# Patient Record
Sex: FEMALE | Race: WHITE | Employment: UNEMPLOYED | URBAN - NONMETROPOLITAN AREA
[De-identification: names, ages, dates, MRNs, and addresses within clinical notes are randomized per-mention and may not be internally consistent; named-entity substitution may affect disease eponyms.]

---

## 2017-06-29 PROBLEM — K02.9 DENTAL CARIES: Chronic | Status: ACTIVE | Noted: 2017-06-29

## 2019-05-15 ENCOUNTER — OUTSIDE SERVICES (OUTPATIENT)
Dept: PRIMARY CARE CLINIC | Age: 59
End: 2019-05-15
Payer: COMMERCIAL

## 2019-05-15 DIAGNOSIS — R19.7 DIARRHEA, UNSPECIFIED TYPE: Primary | ICD-10-CM

## 2019-05-15 PROCEDURE — 99309 SBSQ NF CARE MODERATE MDM 30: CPT | Performed by: FAMILY MEDICINE

## 2019-05-15 NOTE — PROGRESS NOTES
5/15/2019     Denise Rogel    : 1960 Sex: female   Age: 59 y.o. Chief Complaint   Patient presents with    Diarrhea       HPI: This 59y.o. -year-old female  presents today with a chief complaint of diarrhea. . Current medication list reviewed. The patient is tolerating all medications well without adverse events or known side effects. The patient is up-to-date on all age-appropriate wellness issues. The patient presents today with a chief complaint of diarrhea. Patient states she had 3 episodes of diarrhea yesterday. Patient denies any fever or chills. Patient states she did feel somewhat nauseated yesterday but symptoms have resolved. ROS:   Const: Denies changes in appetite, chills, fever, night sweats and weight loss. Eyes:  Denies discharge, a recent change in visual acuity, blurred vision and double vision. ENMT: Denies discharge of the ears, hearing loss, pain of the ears. Denies nasal or sinus symptoms other than stated above. Denies mouth or throat symptoms. CV:  Denies chest pain, dyspnea on exertion, orthopnea, palpitations and PND  Resp: Denies chest pain, cough, SOB and wheezing. GI: Denies abdominal pain, constipation, diarrhea, heartburn, indigestion, nausea and vomiting. : Denies dysuria, frequency, hematuria, nocturia and urgency. Musculo: Denies arthralgias and myalgia  Skin:  Denies lesions, pruritus and rash. Neuro: Denies dizziness, lightheadedness, numbness, tingling and weakness. Psych:  Denies anxiety and depression  Endocrine: Denies anxiety and depression. Hema/Lymph: Denies hematologic symptoms  Allergy/Immuno:  Denies allergic/immunologic symptoms.   Pertinent positives reviewed and noted      Current Outpatient Medications:     warfarin (COUMADIN) 2.5 MG tablet, Take 2.5 mg by mouth daily, Disp: , Rfl:     oxybutynin (DITROPAN-XL) 5 MG extended release tablet, Take 5 mg by mouth daily, Disp: , Rfl:     furosemide (LASIX) 20 MG tablet, Take 20 mg by without noted erythema Septum is in the midline. Posterior pharynx shows no exudate, irritation or redness. Neck:  Supple without adenopathy. Adequate range of motion   Resp: No rales, rhonchi, wheezes appreciated over the lungs bilaterally. CV: S1, S2 within normal limits. Regular rate and rhythm noted. Without murmur, gallop or rub. Extremities:  Pulses intact. Without noted edema. Abdomen: Positive bowel sounds. Palpation reveals softness, with no distension, organomegaly or tenderness. No abdominal masses palpable. Skin: Skin is warm and dry. Musculo: Unchanged upon examination  Neuro: Alert and oriented X3. Cranial nerves grossly intact. Psych: Mood is normal.  Affect is normal.   Vital signs reviewed. Controlled Substances Monitoring:     No flowsheet data found. Plan Per Assessment:  Juan Miguel Mckeon was seen today for diarrhea. Diagnoses and all orders for this visit:    Diarrhea, unspecified type    No follow-ups on file. Symptomatic treatment. Nursing staff advised to call with any further noted change in clinical status. Mara Ramsey MD    Note was generated with the assistance of voice recognition software. Document was reviewed however may contain grammatical errors.

## 2019-05-26 ENCOUNTER — OUTSIDE SERVICES (OUTPATIENT)
Dept: PRIMARY CARE CLINIC | Age: 59
End: 2019-05-26
Payer: COMMERCIAL

## 2019-05-26 DIAGNOSIS — G35 MULTIPLE SCLEROSIS (HCC): ICD-10-CM

## 2019-05-26 DIAGNOSIS — I10 ESSENTIAL HYPERTENSION: Primary | ICD-10-CM

## 2019-05-26 DIAGNOSIS — E11.9 CONTROLLED TYPE 2 DIABETES MELLITUS WITHOUT COMPLICATION, UNSPECIFIED WHETHER LONG TERM INSULIN USE (HCC): ICD-10-CM

## 2019-05-26 PROCEDURE — 99305 1ST NF CARE MODERATE MDM 35: CPT | Performed by: FAMILY MEDICINE

## 2019-05-26 NOTE — PROGRESS NOTES
2019     Leanne Arteaga    : 1960 Sex: female   Age: 59 y.o. Chief Complaint   Patient presents with    Other     Admission       HPI: A follow-up evaluation and management of chronic medical problems for this 59y.o. year-old female resident. The patient was recently discharged home, however, the patient was noted have bedbugs in her residence and was said that to this [facility] facility to be readmitted. The patient is complaining of continued intermittent right lower quadrant pain. The patient recently had a colonoscopy. The patient denies any fever or chills. The patient describes the pain as a dull achy type pain. Current medication list reviewed. The patient is tolerating all medications well without adverse events or known side effects. ROS:   Const: Denies changes in appetite, chills, fever, night sweats and weight loss. Eyes:  Denies discharge, a recent change in visual acuity, blurred vision and double vision. ENMT: Denies discharge of the ears, hearing loss, pain of the ears. Denies mouth or throat symptoms. CV:  Denies chest pain, dyspnea on exertion, orthopnea, palpitations and PND  Resp: Denies chest pain, cough, SOB and wheezing. GI: Denies abdominal pain, constipation, diarrhea, heartburn, indigestion, nausea and vomiting. : Denies dysuria, frequency, hematuria, nocturia and urgency. Musculo: Denies arthralgias and myalgia  Skin:  Denies lesions, pruritus and rash. Neuro: Denies dizziness, lightheadedness, numbness, tingling and weakness. Psych:  Denies anxiety and depression  Endocrine: Denies anxiety and depression. Hema/Lymph: Denies hematologic symptoms  Allergy/Immuno:  Denies allergic/immunologic symptoms.   Pertinent positives reviewed and noted      Current Outpatient Medications:     warfarin (COUMADIN) 2.5 MG tablet, Take 2.5 mg by mouth daily, Disp: , Rfl:     oxybutynin (DITROPAN-XL) 5 MG extended release tablet, Take 5 mg by mouth daily, Disp: , Rfl:    furosemide (LASIX) 20 MG tablet, Take 20 mg by mouth daily, Disp: , Rfl:     citalopram (CELEXA) 40 MG tablet, Take 40 mg by mouth daily Instructed to take morning of surgery with a sip of water, Disp: , Rfl:     lisinopril (PRINIVIL;ZESTRIL) 5 MG tablet, Take 5 mg by mouth daily, Disp: , Rfl:     ferrous sulfate 325 (65 Fe) MG tablet, Take 325 mg by mouth daily (with breakfast), Disp: , Rfl:     potassium chloride (MICRO-K) 10 MEQ extended release capsule, Take 10 mEq by mouth 2 times daily, Disp: , Rfl:     metFORMIN (GLUCOPHAGE) 850 MG tablet, Take 850 mg by mouth 3 times daily, Disp: , Rfl:     atorvastatin (LIPITOR) 40 MG tablet, Take 40 mg by mouth daily, Disp: , Rfl:     amitriptyline (ELAVIL) 50 MG tablet, Take 50 mg by mouth nightly, Disp: , Rfl:     glipiZIDE (GLUCOTROL) 10 MG tablet, Take 10 mg by mouth 2 times daily (before meals), Disp: , Rfl:     baclofen (LIORESAL) 10 MG tablet, Take 10 mg by mouth 3 times daily , Disp: , Rfl:     ranitidine (ZANTAC 150 MAXIMUM STRENGTH) 150 MG tablet, Take 150 mg by mouth 2 times daily Instructed to take morning of surgery with a sip of water, Disp: , Rfl:     Natalizumab (TYSABRI IV), Infuse intravenously every 30 days, Disp: , Rfl:     Allergies   Allergen Reactions    Latex Other (See Comments)     Hands peeled    Adhesive Tape Other (See Comments)     Skin peels    Iodine Swelling    Penicillins Other (See Comments)     Unsure of reaction    Peroxide [Hydrogen Peroxide] Other (See Comments)     Wound gets pus in it    Petroleum Jelly [Petrolatum] Other (See Comments)     Skin irritation, \"eats away skin\"    Sulfa Antibiotics Hives    Clindamycin/Lincomycin Rash     Skin peels    Demerol Hcl [Meperidine] Nausea And Vomiting       Past Medical History:   Diagnosis Date    Cerebral artery occlusion with cerebral infarction ([facility] [address] 2004    no residual    Depression     Diabetes mellitus (HCC)     GERD (gastroesophageal reflux disease)     Hyperlipidemia     Hypertension     [facility]     non ambulatory, uses w/c, arleth, total care     Social History     Socioeconomic History    Marital status:      Spouse name: Not on file    Number of children: Not on file    Years of education: Not on file    Highest education level: Not on file   Occupational History    Not on file   Social Needs    Financial resource strain: Not on file    Food insecurity:     Worry: Not on file     Inability: Not on file    Transportation needs:     Medical: Not on file     Non-medical: Not on file   Tobacco Use    Smoking status: Former Smoker    Tobacco comment: age 23   Substance and Sexual Activity    Alcohol use: No    Drug use: No    Sexual activity: Not on file   Lifestyle    Physical activity:     Days per week: Not on file     Minutes per session: Not on file    Stress: Not on file   Relationships    Social connections:     Talks on phone: Not on file     Gets together: Not on file     Attends [employer] service: Not on file     Active member of club or organization: Not on file     Attends meetings of clubs or organizations: Not on file     Relationship status: Not on file    Intimate partner violence:     Fear of current or ex partner: Not on file     Emotionally abused: Not on file     Physically abused: Not on file     Forced sexual activity: Not on file   Other Topics Concern    Not on file   Social History Narrative    Not on file     Past Surgical History:   Procedure Laterality Date    APPENDECTOMY      CHOLECYSTECTOMY      GASTROSTOMY TUBE PLACEMENT  2004. placed after CVA    removed current    HYSTERECTOMY      OTHER SURGICAL HISTORY  06/30/2017    dental extractions    TRACHEOTOMY  2004    at the time of the CVA in 2004      No family history on file. There were no vitals filed for this visit. Exam: Const: Appears healthy and well developed. No signs of acute distress present.   Eyes: PERRL  ENMT: Tympanic membranes are intact. Nasal mucosa intact without noted erythema Septum is in the midline. Posterior pharynx shows no exudate, irritation or redness. Neck:  Supple without adenopathy. Adequate range of motion   Resp: No rales, rhonchi, wheezes appreciated over the lungs bilaterally. CV: S1, S2 within normal limits. Regular rate and rhythm noted. Without murmur, gallop or rub. Extremities:  Pulses intact. Without noted edema. Abdomen: Positive bowel sounds. Palpation reveals softness, with no distension, organomegaly or tenderness. No abdominal masses palpable. Skin: Skin is warm and dry. Musculo: Unchanged upon examination  Neuro: Alert and oriented X3. Cranial nerves grossly intact. Psych: Mood is normal.  Affect is normal.   Vital signs reviewed. Visit Diagnoses       Codes    Essential hypertension    -  Primary I10    Controlled type 2 diabetes mellitus without complication, unspecified whether long term insulin use ([address]     E11.9    Multiple sclerosis ([address]     G35           No flowsheet data found. Plan Per Assessment:  Tonia Mederos was seen today for other. Diagnoses and all orders for this visit:    Essential hypertension    Controlled type 2 diabetes mellitus without complication, unspecified whether long term insulin use (HCC)    Multiple sclerosis ([address]      Continue current medical therapy. Nursing staff advised to call with any noted change in clinical status. Return in about 1 month (around 6/23/2019). Monae Lee MD    Note was generated with the assistance of voice recognition software. Document was reviewed however may contain grammatical errors.

## 2019-06-16 ENCOUNTER — OUTSIDE SERVICES (OUTPATIENT)
Dept: PRIMARY CARE CLINIC | Age: 59
End: 2019-06-16
Payer: COMMERCIAL

## 2019-06-16 DIAGNOSIS — R10.31 RIGHT LOWER QUADRANT ABDOMINAL PAIN: ICD-10-CM

## 2019-06-16 DIAGNOSIS — E11.9 TYPE 2 DIABETES MELLITUS WITHOUT COMPLICATION, WITHOUT LONG-TERM CURRENT USE OF INSULIN (HCC): Primary | ICD-10-CM

## 2019-06-16 PROCEDURE — 99309 SBSQ NF CARE MODERATE MDM 30: CPT | Performed by: FAMILY MEDICINE

## 2019-06-16 NOTE — PROGRESS NOTES
40 mg by mouth daily Instructed to take morning of surgery with a sip of water, Disp: , Rfl:     lisinopril (PRINIVIL;ZESTRIL) 5 MG tablet, Take 5 mg by mouth daily, Disp: , Rfl:     ferrous sulfate 325 (65 Fe) MG tablet, Take 325 mg by mouth daily (with breakfast), Disp: , Rfl:     potassium chloride (MICRO-K) 10 MEQ extended release capsule, Take 10 mEq by mouth 2 times daily, Disp: , Rfl:     metFORMIN (GLUCOPHAGE) 850 MG tablet, Take 850 mg by mouth 3 times daily, Disp: , Rfl:     atorvastatin (LIPITOR) 40 MG tablet, Take 40 mg by mouth daily, Disp: , Rfl:     amitriptyline (ELAVIL) 50 MG tablet, Take 50 mg by mouth nightly, Disp: , Rfl:     glipiZIDE (GLUCOTROL) 10 MG tablet, Take 10 mg by mouth 2 times daily (before meals), Disp: , Rfl:     baclofen (LIORESAL) 10 MG tablet, Take 10 mg by mouth 3 times daily , Disp: , Rfl:     ranitidine (ZANTAC 150 MAXIMUM STRENGTH) 150 MG tablet, Take 150 mg by mouth 2 times daily Instructed to take morning of surgery with a sip of water, Disp: , Rfl:     Natalizumab (TYSABRI IV), Infuse intravenously every 30 days, Disp: , Rfl:     Allergies   Allergen Reactions    Latex Other (See Comments)     Hands peeled    Adhesive Tape Other (See Comments)     Skin peels    Iodine Swelling    Penicillins Other (See Comments)     Unsure of reaction    Peroxide [Hydrogen Peroxide] Other (See Comments)     Wound gets pus in it    Petroleum Jelly [Petrolatum] Other (See Comments)     Skin irritation, \"eats away skin\"    Sulfa Antibiotics Hives    Clindamycin/Lincomycin Rash     Skin peels    Demerol Hcl [Meperidine] Nausea And Vomiting       Past Medical History:   Diagnosis Date    Cerebral artery occlusion with cerebral infarction ([facility] [address] 2004    no residual    Depression     Diabetes mellitus (HCC)     GERD (gastroesophageal reflux disease)     Hyperlipidemia     Hypertension     [facility]     non ambulatory, uses w/c, arleth, total care     Social History     Socioeconomic History    Marital status:      Spouse name: Not on file    Number of children: Not on file    Years of education: Not on file    Highest education level: Not on file   Occupational History    Not on file   Social Needs    Financial resource strain: Not on file    Food insecurity:     Worry: Not on file     Inability: Not on file    Transportation needs:     Medical: Not on file     Non-medical: Not on file   Tobacco Use    Smoking status: Former Smoker    Tobacco comment: age 23   Substance and Sexual Activity    Alcohol use: No    Drug use: No    Sexual activity: Not on file   Lifestyle    Physical activity:     Days per week: Not on file     Minutes per session: Not on file    Stress: Not on file   Relationships    Social connections:     Talks on phone: Not on file     Gets together: Not on file     Attends Confucianist service: Not on file     Active member of club or organization: Not on file     Attends meetings of clubs or organizations: Not on file     Relationship status: Not on file    Intimate partner violence:     Fear of current or ex partner: Not on file     Emotionally abused: Not on file     Physically abused: Not on file     Forced sexual activity: Not on file   Other Topics Concern    Not on file   Social History Narrative    Not on file     Past Surgical History:   Procedure Laterality Date    APPENDECTOMY      CHOLECYSTECTOMY      GASTROSTOMY TUBE PLACEMENT  2004. placed after CVA    removed current    HYSTERECTOMY      OTHER SURGICAL HISTORY  06/30/2017    dental extractions    TRACHEOTOMY  2004    at the time of the CVA in 2004      No family history on file. There were no vitals filed for this visit. Exam: Const: Appears healthy and well developed. No signs of acute distress present. Eyes: PERRL  ENMT: Tympanic membranes are intact. Nasal mucosa intact without noted erythema Septum is in the midline.   Posterior pharynx shows no exudate, irritation or redness. Neck:  Supple without adenopathy. Adequate range of motion   Resp: No rales, rhonchi, wheezes appreciated over the lungs bilaterally. CV: S1, S2 within normal limits. Regular rate and rhythm noted. Without murmur, gallop or rub. Extremities:  Pulses intact. Without noted edema. Abdomen: Positive bowel sounds. Palpation reveals softness but noted right lower quadrant tenderness to palpation. No rebound tenderness, guarding or rigidity are noted. No abdominal masses palpable. Skin: Skin is warm and dry. Musculo: Unremarkable upon examination  Neuro: Unchanged upon examination. Psych: Mood is normal.  Affect is normal.   Vital signs reviewed. Visit Diagnoses       Codes    Type 2 diabetes mellitus without complication, without long-term current use of insulin ([facility]    -  Primary E11.9           No flowsheet data found. Plan Per Assessment:  Veronica Medrano was seen today for diabetes, other and abdominal pain. Diagnoses and all orders for this visit:    Type 2 diabetes mellitus without complication, without long-term current use of insulin (HCC)    Right lower quadrant abdominal pain      Nursing staff advised to contact the patient's general surgeon concerning persistent abdominal pain. Continue current medical therapy. Nursing staff advised to call with any further noted change in clinical status. Return in about 1 month (around 7/14/2019). Hattie Hickey MD    Note was generated with the assistance of voice recognition software. Document was reviewed however may contain grammatical errors.

## 2019-07-04 ENCOUNTER — OUTSIDE SERVICES (OUTPATIENT)
Dept: PRIMARY CARE CLINIC | Age: 59
End: 2019-07-04
Payer: COMMERCIAL

## 2019-07-04 DIAGNOSIS — R10.9 ABDOMINAL PAIN, UNSPECIFIED ABDOMINAL LOCATION: Primary | ICD-10-CM

## 2019-07-04 DIAGNOSIS — K59.00 CONSTIPATION, UNSPECIFIED CONSTIPATION TYPE: ICD-10-CM

## 2019-07-04 PROCEDURE — 99305 1ST NF CARE MODERATE MDM 35: CPT | Performed by: FAMILY MEDICINE

## 2019-07-20 ENCOUNTER — OUTSIDE SERVICES (OUTPATIENT)
Dept: PRIMARY CARE CLINIC | Age: 59
End: 2019-07-20
Payer: COMMERCIAL

## 2019-07-20 DIAGNOSIS — K59.00 CONSTIPATION, UNSPECIFIED CONSTIPATION TYPE: Primary | ICD-10-CM

## 2019-07-20 DIAGNOSIS — L60.9 NAIL DISORDER: ICD-10-CM

## 2019-07-20 PROCEDURE — 99309 SBSQ NF CARE MODERATE MDM 30: CPT | Performed by: FAMILY MEDICINE

## 2019-07-20 NOTE — PROGRESS NOTES
2019     Lyndon Washburn    : 1960 Sex: female   Age: 59 y.o. Chief Complaint   Patient presents with    Constipation    Other     Nail disorder       HPI: This 69-year-old female resident presents today with a chief complaint of constipation. The patient does have a history of same. The patient is currently on Colace 1 twice daily. Patient also presents today with a complaint of nail disorder of the right hand index finger. Current medication list reviewed. The patient is tolerating all medications well without adverse events or known side effects. ROS:   Const: Denies changes in appetite, chills, fever, night sweats and weight loss. Eyes:  Denies discharge, a recent change in visual acuity, blurred vision and double vision. ENMT: Denies discharge of the ears, hearing loss, pain of the ears. Denies mouth or throat symptoms. CV:  Denies chest pain, dyspnea on exertion, orthopnea, palpitations and PND  Resp: Denies chest pain, cough, SOB and wheezing. GI: Denies abdominal pain, constipation, diarrhea, heartburn, indigestion, nausea and vomiting. : Denies dysuria, frequency, hematuria, nocturia and urgency. Musculo: Denies arthralgias and myalgia  Skin:  Denies lesions, pruritus and rash. Neuro: Denies dizziness, lightheadedness, numbness, tingling and weakness. Psych:  Denies anxiety and depression  Endocrine: Denies anxiety and depression. Hema/Lymph: Denies hematologic symptoms  Allergy/Immuno:  Denies allergic/immunologic symptoms.   Pertinent positives reviewed and noted      Current Outpatient Medications:     warfarin (COUMADIN) 2.5 MG tablet, Take 2.5 mg by mouth daily, Disp: , Rfl:     oxybutynin (DITROPAN-XL) 5 MG extended release tablet, Take 5 mg by mouth daily, Disp: , Rfl:     furosemide (LASIX) 20 MG tablet, Take 20 mg by mouth daily, Disp: , Rfl:     citalopram (CELEXA) 40 MG tablet, Take 40 mg by mouth daily Instructed to take morning of surgery with a sip Spouse name: Not on file    Number of children: Not on file    Years of education: Not on file    Highest education level: Not on file   Occupational History    Not on file   Social Needs    Financial resource strain: Not on file    Food insecurity:     Worry: Not on file     Inability: Not on file    Transportation needs:     Medical: Not on file     Non-medical: Not on file   Tobacco Use    Smoking status: Former Smoker    Tobacco comment: age 23   Substance and Sexual Activity    Alcohol use: No    Drug use: No    Sexual activity: Not on file   Lifestyle    Physical activity:     Days per week: Not on file     Minutes per session: Not on file    Stress: Not on file   Relationships    Social connections:     Talks on phone: Not on file     Gets together: Not on file     Attends Protestant service: Not on file     Active member of club or organization: Not on file     Attends meetings of clubs or organizations: Not on file     Relationship status: Not on file    Intimate partner violence:     Fear of current or ex partner: Not on file     Emotionally abused: Not on file     Physically abused: Not on file     Forced sexual activity: Not on file   Other Topics Concern    Not on file   Social History Narrative    Not on file     Past Surgical History:   Procedure Laterality Date    APPENDECTOMY      CHOLECYSTECTOMY      GASTROSTOMY TUBE PLACEMENT  2004. placed after CVA    removed current    HYSTERECTOMY      OTHER SURGICAL HISTORY  06/30/2017    dental extractions    TRACHEOTOMY  2004    at the time of the CVA in 2004      No family history on file. There were no vitals filed for this visit. Exam: Const: Appears healthy and well developed. No signs of acute distress present. Eyes: PERRL  ENMT: Tympanic membranes are intact. Nasal mucosa intact without noted erythema Septum is in the midline. Posterior pharynx shows no exudate, irritation or redness.   Neck:  Supple without

## 2019-07-28 ENCOUNTER — OUTSIDE SERVICES (OUTPATIENT)
Dept: PRIMARY CARE CLINIC | Age: 59
End: 2019-07-28
Payer: COMMERCIAL

## 2019-07-28 DIAGNOSIS — E11.9 TYPE 2 DIABETES MELLITUS WITHOUT COMPLICATION, UNSPECIFIED WHETHER LONG TERM INSULIN USE (HCC): Primary | ICD-10-CM

## 2019-07-28 DIAGNOSIS — G35 MULTIPLE SCLEROSIS (HCC): ICD-10-CM

## 2019-07-28 DIAGNOSIS — F41.9 ANXIETY: ICD-10-CM

## 2019-07-28 PROCEDURE — 99309 SBSQ NF CARE MODERATE MDM 30: CPT | Performed by: FAMILY MEDICINE

## 2019-07-28 NOTE — PROGRESS NOTES
file    Number of children: Not on file    Years of education: Not on file    Highest education level: Not on file   Occupational History    Not on file   Social Needs    Financial resource strain: Not on file    Food insecurity:     Worry: Not on file     Inability: Not on file    Transportation needs:     Medical: Not on file     Non-medical: Not on file   Tobacco Use    Smoking status: Former Smoker    Tobacco comment: age 23   Substance and Sexual Activity    Alcohol use: No    Drug use: No    Sexual activity: Not on file   Lifestyle    Physical activity:     Days per week: Not on file     Minutes per session: Not on file    Stress: Not on file   Relationships    Social connections:     Talks on phone: Not on file     Gets together: Not on file     Attends Judaism service: Not on file     Active member of club or organization: Not on file     Attends meetings of clubs or organizations: Not on file     Relationship status: Not on file    Intimate partner violence:     Fear of current or ex partner: Not on file     Emotionally abused: Not on file     Physically abused: Not on file     Forced sexual activity: Not on file   Other Topics Concern    Not on file   Social History Narrative    Not on file     Past Surgical History:   Procedure Laterality Date    APPENDECTOMY      CHOLECYSTECTOMY      GASTROSTOMY TUBE PLACEMENT  2004. placed after CVA    removed current    HYSTERECTOMY      OTHER SURGICAL HISTORY  06/30/2017    dental extractions    TRACHEOTOMY  2004    at the time of the CVA in 2004      No family history on file. There were no vitals filed for this visit. Exam: Const: Appears healthy and well developed. No signs of acute distress present. Eyes: PERRL  ENMT: Tympanic membranes are intact. Nasal mucosa intact without noted erythema Septum is in the midline. Posterior pharynx shows no exudate, irritation or redness. Neck:  Supple without adenopathy.   Adequate range of motion   Resp: No rales, rhonchi, wheezes appreciated over the lungs bilaterally. CV: S1, S2 within normal limits. Regular rate and rhythm noted. Without murmur, gallop or rub. Extremities:  Pulses intact. Without noted edema. Abdomen: Noted right-sided tenderness to palpation. .  Skin: Skin is warm and dry. Musculo: Unchanged upon examination  Neuro: Alert and oriented X3. Unchanged upon examination. Psych: Mood is normal.  Affect is normal.   Vital signs reviewed. No flowsheet data found. Plan Per Assessment:  Delos Kocher was seen today for diabetes, other and anxiety. Diagnoses and all orders for this visit:    Type 2 diabetes mellitus without complication, unspecified whether long term insulin use (HCC)    Multiple sclerosis ([facility] [address]    Anxiety      To new current medical therapy. Nursing staff to call with any noted change in clinical status. Return in about 4 weeks (around 8/25/2019). Renzo Murray MD    Note was generated with the assistance of voice recognition software. Document was reviewed however may contain grammatical errors.

## 2019-08-09 ENCOUNTER — OUTSIDE SERVICES (OUTPATIENT)
Dept: PRIMARY CARE CLINIC | Age: 59
End: 2019-08-09
Payer: COMMERCIAL

## 2019-08-09 DIAGNOSIS — R10.31 RIGHT LOWER QUADRANT ABDOMINAL PAIN: ICD-10-CM

## 2019-08-09 DIAGNOSIS — L89.90 PRESSURE INJURY OF SKIN, UNSPECIFIED INJURY STAGE, UNSPECIFIED LOCATION: ICD-10-CM

## 2019-08-09 DIAGNOSIS — K59.00 CONSTIPATION, UNSPECIFIED CONSTIPATION TYPE: Primary | ICD-10-CM

## 2019-08-09 LAB
ALBUMIN: 3.4
BUN / CREAT RATIO: ABNORMAL
BUN BLDV-MCNC: 14 MG/DL
CREAT SERPL-MCNC: 0.4 MG/DL
HCT VFR BLD CALC: 34.6 % (ref 36–46)
HEMOGLOBIN: 11.1 G/DL (ref 12–16)
INR BLD: 2.2
PROTIME: 23.8 SECONDS

## 2019-08-09 PROCEDURE — 99309 SBSQ NF CARE MODERATE MDM 30: CPT | Performed by: FAMILY MEDICINE

## 2019-08-11 ENCOUNTER — OUTSIDE SERVICES (OUTPATIENT)
Dept: PRIMARY CARE CLINIC | Age: 59
End: 2019-08-11
Payer: COMMERCIAL

## 2019-08-11 DIAGNOSIS — E11.9 TYPE 2 DIABETES MELLITUS WITHOUT COMPLICATION, UNSPECIFIED WHETHER LONG TERM INSULIN USE (HCC): Primary | ICD-10-CM

## 2019-08-11 DIAGNOSIS — F41.9 ANXIETY: ICD-10-CM

## 2019-08-11 DIAGNOSIS — G35 MULTIPLE SCLEROSIS (HCC): ICD-10-CM

## 2019-08-11 PROCEDURE — 99309 SBSQ NF CARE MODERATE MDM 30: CPT | Performed by: FAMILY MEDICINE

## 2019-08-12 LAB
INR BLD: 2.6
PROTIME: 27.9 SECONDS

## 2019-08-20 LAB
INR BLD: 6.2
PROTIME: 66.6 SECONDS

## 2019-08-22 LAB
INR BLD: 2.1
PROTIME: 22.6 SECONDS

## 2019-09-07 ENCOUNTER — OUTSIDE SERVICES (OUTPATIENT)
Dept: PRIMARY CARE CLINIC | Age: 59
End: 2019-09-07
Payer: COMMERCIAL

## 2019-09-07 DIAGNOSIS — R10.31 RIGHT LOWER QUADRANT ABDOMINAL PAIN: ICD-10-CM

## 2019-09-07 DIAGNOSIS — F41.9 ANXIETY: ICD-10-CM

## 2019-09-07 DIAGNOSIS — E11.9 TYPE 2 DIABETES MELLITUS WITHOUT COMPLICATION, WITHOUT LONG-TERM CURRENT USE OF INSULIN (HCC): Primary | ICD-10-CM

## 2019-09-07 DIAGNOSIS — G35 MULTIPLE SCLEROSIS (HCC): ICD-10-CM

## 2019-09-07 PROCEDURE — 99309 SBSQ NF CARE MODERATE MDM 30: CPT | Performed by: FAMILY MEDICINE

## 2019-09-07 NOTE — PROGRESS NOTES
2019     Israel Coelho    : 1960 Sex: female   Age: 62 y.o. Chief Complaint   Patient presents with    Diabetes    Anxiety    Other     Multiple sclerosis    Abdominal Pain       HPI: A follow-up evaluation and management of chronic medical problems for this 62y.o. year-old female resident. The patient continues to complain of right lower quadrant pain. The patient states the pain is constant and rates it as a 4-5 on a scale of 1-10. The patient has had an extensive work-up in the past including imaging studies and has been seen in consultation by her general surgeon. Current medication list reviewed. The patient is tolerating all medications well without adverse events or known side effects. ROS:   Const: Denies changes in appetite, chills, fever, night sweats and weight loss. Eyes:  Denies discharge, a recent change in visual acuity, blurred vision and double vision. ENMT: Denies discharge of the ears, hearing loss, pain of the ears. Denies mouth or throat symptoms. CV:  Denies chest pain, dyspnea on exertion, orthopnea, palpitations and PND  Resp: Denies chest pain, cough, SOB and wheezing. GI: Denies abdominal pain, constipation, diarrhea, heartburn, indigestion, nausea and vomiting. : Denies dysuria, frequency, hematuria, nocturia and urgency. Musculo: Denies arthralgias and myalgia  Skin:  Denies lesions, pruritus and rash. Neuro: Denies dizziness, lightheadedness, numbness, tingling and weakness. Psych:  Denies anxiety and depression  Endocrine: Denies anxiety and depression. Hema/Lymph: Denies hematologic symptoms  Allergy/Immuno:  Denies allergic/immunologic symptoms.   Pertinent positives reviewed and noted      Current Outpatient Medications:     warfarin (COUMADIN) 2.5 MG tablet, Take 2.5 mg by mouth daily, Disp: , Rfl:     oxybutynin (DITROPAN-XL) 5 MG extended release tablet, Take 5 mg by mouth daily, Disp: , Rfl:     furosemide (LASIX) 20 MG tablet, Take

## 2019-10-06 ENCOUNTER — OUTSIDE SERVICES (OUTPATIENT)
Dept: PRIMARY CARE CLINIC | Age: 59
End: 2019-10-06
Payer: COMMERCIAL

## 2019-10-06 DIAGNOSIS — G35 MULTIPLE SCLEROSIS (HCC): ICD-10-CM

## 2019-10-06 DIAGNOSIS — I10 ESSENTIAL HYPERTENSION: ICD-10-CM

## 2019-10-06 DIAGNOSIS — E11.9 TYPE 2 DIABETES MELLITUS WITHOUT COMPLICATION, WITHOUT LONG-TERM CURRENT USE OF INSULIN (HCC): Primary | ICD-10-CM

## 2019-10-06 PROCEDURE — 99309 SBSQ NF CARE MODERATE MDM 30: CPT | Performed by: FAMILY MEDICINE

## 2019-11-02 ENCOUNTER — OUTSIDE SERVICES (OUTPATIENT)
Dept: PRIMARY CARE CLINIC | Age: 59
End: 2019-11-02
Payer: COMMERCIAL

## 2019-11-02 DIAGNOSIS — I10 ESSENTIAL HYPERTENSION: Primary | ICD-10-CM

## 2019-11-02 DIAGNOSIS — G35 MULTIPLE SCLEROSIS (HCC): ICD-10-CM

## 2019-11-02 PROCEDURE — 99309 SBSQ NF CARE MODERATE MDM 30: CPT | Performed by: FAMILY MEDICINE

## 2019-12-08 ENCOUNTER — OUTSIDE SERVICES (OUTPATIENT)
Dept: PRIMARY CARE CLINIC | Age: 59
End: 2019-12-08
Payer: COMMERCIAL

## 2019-12-08 DIAGNOSIS — J40 BRONCHITIS: Primary | ICD-10-CM

## 2019-12-08 PROCEDURE — 99309 SBSQ NF CARE MODERATE MDM 30: CPT | Performed by: FAMILY MEDICINE

## 2019-12-22 ENCOUNTER — OUTSIDE SERVICES (OUTPATIENT)
Dept: PRIMARY CARE CLINIC | Age: 59
End: 2019-12-22
Payer: COMMERCIAL

## 2019-12-22 DIAGNOSIS — J40 BRONCHITIS: Primary | ICD-10-CM

## 2019-12-22 PROCEDURE — 99309 SBSQ NF CARE MODERATE MDM 30: CPT | Performed by: FAMILY MEDICINE

## 2020-01-16 ENCOUNTER — OUTSIDE SERVICES (OUTPATIENT)
Dept: PRIMARY CARE CLINIC | Age: 60
End: 2020-01-16
Payer: COMMERCIAL

## 2020-01-16 PROCEDURE — 99309 SBSQ NF CARE MODERATE MDM 30: CPT | Performed by: FAMILY MEDICINE

## 2020-01-19 ENCOUNTER — OUTSIDE SERVICES (OUTPATIENT)
Dept: PRIMARY CARE CLINIC | Age: 60
End: 2020-01-19
Payer: COMMERCIAL

## 2020-01-19 PROCEDURE — 99309 SBSQ NF CARE MODERATE MDM 30: CPT | Performed by: FAMILY MEDICINE

## 2020-01-19 NOTE — PROGRESS NOTES
2020     Marcell Thornton    : 1960 Sex: female   Age: 62 y.o. Chief Complaint   Patient presents with    Hypertension    Diabetes    Anxiety    Other     MS       HPI: A follow-up evaluation and management of chronic medical problems for this 62y.o. year-old female resident. No change in clinical status as noted per nursing staff. Current medication list reviewed. The patient is tolerating all medications well without adverse events or known side effects. The patient was recently started on antibiotic therapy for bronchitis and states she is clinically improved. ROS:   Const: Denies changes in appetite, chills, fever, night sweats and weight loss. Eyes:  Denies discharge, a recent change in visual acuity, blurred vision and double vision. ENMT: Denies discharge of the ears, hearing loss, pain of the ears. Denies mouth or throat symptoms. CV:  Denies chest pain, dyspnea on exertion, orthopnea, palpitations and PND  Resp: Denies chest pain, cough, SOB and wheezing. GI: Denies abdominal pain, constipation, diarrhea, heartburn, indigestion, nausea and vomiting. : Denies dysuria, frequency, hematuria, nocturia and urgency. Musculo: Denies arthralgias and myalgia  Skin:  Denies lesions, pruritus and rash. Neuro: Denies dizziness, lightheadedness, numbness, tingling and weakness. Psych:  Denies anxiety and depression  Endocrine: Denies anxiety and depression. Hema/Lymph: Denies hematologic symptoms  Allergy/Immuno:  Denies allergic/immunologic symptoms.   Pertinent positives reviewed and noted      Current Outpatient Medications:     warfarin (COUMADIN) 2.5 MG tablet, Take 2.5 mg by mouth daily, Disp: , Rfl:     oxybutynin (DITROPAN-XL) 5 MG extended release tablet, Take 5 mg by mouth daily, Disp: , Rfl:     furosemide (LASIX) 20 MG tablet, Take 20 mg by mouth daily, Disp: , Rfl:     citalopram (CELEXA) 40 MG tablet, Take 40 mg by mouth daily Instructed to take morning of surgery      Spouse name: Not on file    Number of children: Not on file    Years of education: Not on file    Highest education level: Not on file   Occupational History    Not on file   Social Needs    Financial resource strain: Not on file    Food insecurity:     Worry: Not on file     Inability: Not on file    Transportation needs:     Medical: Not on file     Non-medical: Not on file   Tobacco Use    Smoking status: Former Smoker    Tobacco comment: age 23   Substance and Sexual Activity    Alcohol use: No    Drug use: No    Sexual activity: Not on file   Lifestyle    Physical activity:     Days per week: Not on file     Minutes per session: Not on file    Stress: Not on file   Relationships    Social connections:     Talks on phone: Not on file     Gets together: Not on file     Attends [employer] service: Not on file     Active member of club or organization: Not on file     Attends meetings of clubs or organizations: Not on file     Relationship status: Not on file    Intimate partner violence:     Fear of current or ex partner: Not on file     Emotionally abused: Not on file     Physically abused: Not on file     Forced sexual activity: Not on file   Other Topics Concern    Not on file   Social History Narrative    Not on file     Past Surgical History:   Procedure Laterality Date    APPENDECTOMY      CHOLECYSTECTOMY      GASTROSTOMY TUBE PLACEMENT  2004. placed after CVA    removed current    HYSTERECTOMY      OTHER SURGICAL HISTORY  06/30/2017    dental extractions    TRACHEOTOMY  2004    at the time of the CVA in 2004      No family history on file. There were no vitals filed for this visit. Exam: Const: Appears healthy and well developed. No signs of acute distress present. Eyes: PERRL  ENMT: Tympanic membranes are intact. Nasal mucosa intact without noted erythema Septum is in the midline. Posterior pharynx shows no exudate, irritation or redness.   Neck:  Supple without adenopathy. Adequate range of motion   Resp: No rales, rhonchi, wheezes appreciated over the lungs bilaterally. CV: S1, S2 within normal limits. Regular rate and rhythm noted. Without murmur, gallop or rub. Extremities:  Pulses intact. Without noted edema. Abdomen: Positive bowel sounds. Palpation reveals softness, with no distension, organomegaly or tenderness. No abdominal masses palpable. Skin: Skin is warm and dry. Musculo: Unchanged upon examination. Neuro: Alert and oriented X3. Unchanged upon examination. Psych: Mood is normal.  Affect is normal.   Vital signs reviewed. No flowsheet data found. Plan Per Assessment:  Jett Cardona was seen today for hypertension, diabetes, anxiety and other. Diagnoses and all orders for this visit:    Essential hypertension    Type 2 diabetes mellitus without complication, without long-term current use of insulin (HCC)    Multiple sclerosis (HCC)    Anxiety      Continue current medical therapy. Nursing staff to call with any noted change in clinical status. Return in about 4 weeks (around 2/16/2020) for MEDICATION CHECK, FOLLOW UP [address] [place] [address] MD Kishore    Note was generated with the assistance of voice recognition software. Document was reviewed however may contain grammatical errors.

## 2020-02-09 ENCOUNTER — OUTSIDE SERVICES (OUTPATIENT)
Dept: PRIMARY CARE CLINIC | Age: 60
End: 2020-02-09
Payer: COMMERCIAL

## 2020-02-09 PROCEDURE — 99309 SBSQ NF CARE MODERATE MDM 30: CPT | Performed by: FAMILY MEDICINE

## 2020-02-09 NOTE — PROGRESS NOTES
ferrous sulfate 325 (65 Fe) MG tablet, Take 325 mg by mouth daily (with breakfast), Disp: , Rfl:     potassium chloride (MICRO-K) 10 MEQ extended release capsule, Take 10 mEq by mouth 2 times daily, Disp: , Rfl:     metFORMIN (GLUCOPHAGE) 850 MG tablet, Take 850 mg by mouth 3 times daily, Disp: , Rfl:     atorvastatin (LIPITOR) 40 MG tablet, Take 40 mg by mouth daily, Disp: , Rfl:     amitriptyline (ELAVIL) 50 MG tablet, Take 50 mg by mouth nightly, Disp: , Rfl:     glipiZIDE (GLUCOTROL) 10 MG tablet, Take 10 mg by mouth 2 times daily (before meals), Disp: , Rfl:     baclofen (LIORESAL) 10 MG tablet, Take 10 mg by mouth 3 times daily , Disp: , Rfl:     ranitidine (ZANTAC 150 MAXIMUM STRENGTH) 150 MG tablet, Take 150 mg by mouth 2 times daily Instructed to take morning of surgery with a sip of water, Disp: , Rfl:     Natalizumab (TYSABRI IV), Infuse intravenously every 30 days, Disp: , Rfl:     Allergies   Allergen Reactions    Latex Other (See Comments)     Hands peeled    Adhesive Tape Other (See Comments)     Skin peels    Iodine Swelling    Penicillins Other (See Comments)     Unsure of reaction    Peroxide [Hydrogen Peroxide] Other (See Comments)     Wound gets pus in it    Petroleum Jelly [Petrolatum] Other (See Comments)     Skin irritation, \"eats away skin\"    Sulfa Antibiotics Hives    Clindamycin/Lincomycin Rash     Skin peels    Demerol Hcl [Meperidine] Nausea And Vomiting       Past Medical History:   Diagnosis Date    Cerebral artery occlusion with cerebral infarction ([facility] [address] 2004    no residual    Depression     Diabetes mellitus (HCC)     GERD (gastroesophageal reflux disease)     Hyperlipidemia     Hypertension     Multiple sclerosis ([facility] [address]     non ambulatory, uses w/c, arleth, total care     Social History     Socioeconomic History    Marital status:      Spouse name: Not on file    Number of children: Not on file    Years of education: Not on file    Highest education

## 2020-02-29 ENCOUNTER — OUTSIDE SERVICES (OUTPATIENT)
Dept: PRIMARY CARE CLINIC | Age: 60
End: 2020-02-29
Payer: COMMERCIAL

## 2020-02-29 PROCEDURE — 99309 SBSQ NF CARE MODERATE MDM 30: CPT | Performed by: FAMILY MEDICINE

## 2020-02-29 NOTE — PROGRESS NOTES
 Number of children: Not on file    Years of education: Not on file    Highest education level: Not on file   Occupational History    Not on file   Social Needs    Financial resource strain: Not on file    Food insecurity:     Worry: Not on file     Inability: Not on file    Transportation needs:     Medical: Not on file     Non-medical: Not on file   Tobacco Use    Smoking status: Former Smoker    Tobacco comment: age 23   Substance and Sexual Activity    Alcohol use: No    Drug use: No    Sexual activity: Not on file   Lifestyle    Physical activity:     Days per week: Not on file     Minutes per session: Not on file    Stress: Not on file   Relationships    Social connections:     Talks on phone: Not on file     Gets together: Not on file     Attends Christianity service: Not on file     Active member of club or organization: Not on file     Attends meetings of clubs or organizations: Not on file     Relationship status: Not on file    Intimate partner violence:     Fear of current or ex partner: Not on file     Emotionally abused: Not on file     Physically abused: Not on file     Forced sexual activity: Not on file   Other Topics Concern    Not on file   Social History Narrative    Not on file     Past Surgical History:   Procedure Laterality Date    APPENDECTOMY      CHOLECYSTECTOMY      GASTROSTOMY TUBE PLACEMENT  2004. placed after CVA    removed current    HYSTERECTOMY      OTHER SURGICAL HISTORY  06/30/2017    dental extractions    TRACHEOTOMY  2004    at the time of the CVA in 2004      No family history on file. There were no vitals filed for this visit. Exam: Const: Appears healthy and well developed. No signs of acute distress present. Eyes: PERRL  ENMT: Tympanic membranes are intact. Nasal mucosa intact without noted erythema Septum is in the midline. Posterior pharynx shows no exudate, irritation or redness. Neck:  Supple without adenopathy.   Adequate range of

## 2020-03-03 LAB
BUN BLDV-MCNC: 21 MG/DL
CALCIUM SERPL-MCNC: 8.4 MG/DL
CHLORIDE BLD-SCNC: 96 MMOL/L
CO2: 25 MMOL/L
CREAT SERPL-MCNC: 0.3 MG/DL
GFR CALCULATED: 274
GLUCOSE BLD-MCNC: 136 MG/DL
POTASSIUM SERPL-SCNC: 4.2 MMOL/L
SODIUM BLD-SCNC: 132 MMOL/L

## 2020-03-08 ENCOUNTER — OUTSIDE SERVICES (OUTPATIENT)
Dept: PRIMARY CARE CLINIC | Age: 60
End: 2020-03-08
Payer: COMMERCIAL

## 2020-03-08 PROCEDURE — 99309 SBSQ NF CARE MODERATE MDM 30: CPT | Performed by: FAMILY MEDICINE

## 2020-03-08 NOTE — PROGRESS NOTES
without adenopathy. Adequate range of motion   Resp: No rales, rhonchi, wheezes appreciated over the lungs bilaterally. CV: S1, S2 within normal limits. Regular rate and rhythm noted. Without murmur, gallop or rub. Extremities:  Pulses intact. Without noted edema. Abdomen: Positive bowel sounds. Palpation reveals softness, with no distension, organomegaly or tenderness. No abdominal masses palpable. Skin: Skin is warm and dry. Musculo: Unchanged upon examination. Neuro: Alert and oriented X3. Cranial nerves grossly intact. Psych: Mood is normal.  Affect is normal.   Vital signs reviewed. Visit Diagnoses       Codes    Gastroenteritis    -  Primary K52.9           No flowsheet data found. Plan Per Assessment:  Sahra Jameson was seen today for other. Diagnoses and all orders for this visit:    Gastroenteritis      Continue current medical therapy. Nursing staff to call with any further noted change in clinical status. Return in about 1 week (around 3/15/2020) for MEDICATION CHECK, FOLLOW UP [id number] [facility] Genia Trevizo MD    Note was generated with the assistance of voice recognition software. Document was reviewed however may contain grammatical errors.

## 2020-03-15 ENCOUNTER — OUTSIDE SERVICES (OUTPATIENT)
Dept: PRIMARY CARE CLINIC | Age: 60
End: 2020-03-15
Payer: COMMERCIAL

## 2020-03-15 PROCEDURE — 99309 SBSQ NF CARE MODERATE MDM 30: CPT | Performed by: FAMILY MEDICINE

## 2020-03-15 NOTE — PROGRESS NOTES
3/15/2020     Hilton Score    : 1960 Sex: female   Age: 62 y.o. Chief Complaint   Patient presents with    Hypertension    Diabetes    Multiple Sclerosis       HPI: A follow-up evaluation and management of chronic medical problems for this 62y.o. year-old female resident. No change in clinical status as noted per nursing staff. Current medication list reviewed. The patient is tolerating all medications well without adverse events or known side effects. ROS:   Const: Denies changes in appetite, chills, fever, night sweats and weight loss. Eyes:  Denies discharge, a recent change in visual acuity, blurred vision and double vision. ENMT: Denies discharge of the ears, hearing loss, pain of the ears. Denies mouth or throat symptoms. CV:  Denies chest pain, dyspnea on exertion, orthopnea, palpitations and PND  Resp: Denies chest pain, cough, SOB and wheezing. GI: Denies abdominal pain, constipation, diarrhea, heartburn, indigestion, nausea and vomiting. : Denies dysuria, frequency, hematuria, nocturia and urgency. Musculo: Denies arthralgias and myalgia  Skin:  Denies lesions, pruritus and rash. Neuro: Denies dizziness, lightheadedness, numbness, tingling and weakness. Psych:  Denies anxiety and depression  Endocrine: Denies anxiety and depression. Hema/Lymph: Denies hematologic symptoms  Allergy/Immuno:  Denies allergic/immunologic symptoms.   Pertinent positives reviewed and noted      Current Outpatient Medications:     warfarin (COUMADIN) 2.5 MG tablet, Take 2.5 mg by mouth daily, Disp: , Rfl:     oxybutynin (DITROPAN-XL) 5 MG extended release tablet, Take 5 mg by mouth daily, Disp: , Rfl:     furosemide (LASIX) 20 MG tablet, Take 20 mg by mouth daily, Disp: , Rfl:     citalopram (CELEXA) 40 MG tablet, Take 40 mg by mouth daily Instructed to take morning of surgery with a sip of water, Disp: , Rfl:     lisinopril (PRINIVIL;ZESTRIL) 5 MG tablet, Take 5 mg by mouth daily, Disp: , Rfl:     ferrous sulfate 325 (65 Fe) MG tablet, Take 325 mg by mouth daily (with breakfast), Disp: , Rfl:     potassium chloride (MICRO-K) 10 MEQ extended release capsule, Take 10 mEq by mouth 2 times daily, Disp: , Rfl:     metFORMIN (GLUCOPHAGE) 850 MG tablet, Take 850 mg by mouth 3 times daily, Disp: , Rfl:     atorvastatin (LIPITOR) 40 MG tablet, Take 40 mg by mouth daily, Disp: , Rfl:     amitriptyline (ELAVIL) 50 MG tablet, Take 50 mg by mouth nightly, Disp: , Rfl:     glipiZIDE (GLUCOTROL) 10 MG tablet, Take 10 mg by mouth 2 times daily (before meals), Disp: , Rfl:     baclofen (LIORESAL) 10 MG tablet, Take 10 mg by mouth 3 times daily , Disp: , Rfl:     ranitidine (ZANTAC 150 MAXIMUM STRENGTH) 150 MG tablet, Take 150 mg by mouth 2 times daily Instructed to take morning of surgery with a sip of water, Disp: , Rfl:     Natalizumab (TYSABRI IV), Infuse intravenously every 30 days, Disp: , Rfl:     Allergies   Allergen Reactions    Latex Other (See Comments)     Hands peeled    Adhesive Tape Other (See Comments)     Skin peels    Iodine Swelling    Penicillins Other (See Comments)     Unsure of reaction    Peroxide [Hydrogen Peroxide] Other (See Comments)     Wound gets pus in it    Petroleum Jelly [Petrolatum] Other (See Comments)     Skin irritation, \"eats away skin\"    Sulfa Antibiotics Hives    Clindamycin/Lincomycin Rash     Skin peels    Demerol Hcl [Meperidine] Nausea And Vomiting       Past Medical History:   Diagnosis Date    Cerebral artery occlusion with cerebral infarction ([facility] [address] 2004    no residual    Depression     Diabetes mellitus (HCC)     GERD (gastroesophageal reflux disease)     Hyperlipidemia     Hypertension     Multiple sclerosis ([facility] [address]     non ambulatory, uses w/c, arleth, total care     Social History     Socioeconomic History    Marital status:      Spouse name: Not on file    Number of children: Not on file    Years of education: Not on file    Highest S2 within normal limits. Regular rate and rhythm noted. Without murmur, gallop or rub. Extremities:  Pulses intact. Without noted edema. Abdomen: Positive bowel sounds. Palpation reveals softness, with no distension, organomegaly or tenderness. No abdominal masses palpable. Skin: Skin is warm and dry. Musculo: Unchanged upon examination. Neuro: Alert and oriented X3. Otherwise unchanged upon examination. Psych: Mood is normal.  Affect is normal.   Vital signs reviewed. No flowsheet data found. Plan Per Assessment:  Lily James was seen today for hypertension, diabetes and multiple sclerosis. Diagnoses and all orders for this visit:    Essential hypertension    Type 2 diabetes mellitus without complication, without long-term current use of insulin (HCC)    Multiple sclerosis ([facility] [address]      Continue current medical therapy. Nursing staff to call with any noted change in clinical status. Return in about 4 weeks (around 4/12/2020) for MEDICATION CHECK, FOLLOW UP [id number] [facility] Lizette Velásquez MD    Note was generated with the assistance of voice recognition software. Document was reviewed however may contain grammatical errors.

## 2020-04-04 ENCOUNTER — OUTSIDE SERVICES (OUTPATIENT)
Dept: PRIMARY CARE CLINIC | Age: 60
End: 2020-04-04
Payer: COMMERCIAL

## 2020-04-04 PROCEDURE — 99309 SBSQ NF CARE MODERATE MDM 30: CPT | Performed by: FAMILY MEDICINE

## 2020-04-04 NOTE — PROGRESS NOTES
S2 within normal limits. Regular rate and rhythm noted. Without murmur, gallop or rub. Extremities:  Pulses intact. Without noted edema. Abdomen: Positive bowel sounds. Palpation reveals softness, with no distension, organomegaly or tenderness. No abdominal masses palpable. Skin: Skin is warm and dry. Musculo: Unchanged upon examination. Neuro: Unchanged upon examination. Psych: Mood is normal.  Affect is normal.   Vital signs reviewed. No flowsheet data found. Plan Per Assessment:  Roseann Cardenas was seen today for other and hypertension. Diagnoses and all orders for this visit:    Essential hypertension    Multiple sclerosis ([address]      Continue current medical therapy. Nursing staff advised to call with any change in clinical status. Return in about 4 weeks (around 5/2/2020) for MEDICATION CHECK, FOLLOW UP [facility]    Note was generated with the assistance of voice recognition software. Document was reviewed however may contain grammatical errors.

## 2020-04-27 LAB
AVERAGE GLUCOSE: 203
HBA1C MFR BLD: 8.7 %

## 2020-04-28 ENCOUNTER — OUTSIDE SERVICES (OUTPATIENT)
Dept: PRIMARY CARE CLINIC | Age: 60
End: 2020-04-28
Payer: COMMERCIAL

## 2020-04-28 PROCEDURE — 99309 SBSQ NF CARE MODERATE MDM 30: CPT | Performed by: FAMILY MEDICINE

## 2020-05-10 ENCOUNTER — OUTSIDE SERVICES (OUTPATIENT)
Dept: PRIMARY CARE CLINIC | Age: 60
End: 2020-05-10
Payer: COMMERCIAL

## 2020-05-10 PROCEDURE — 99309 SBSQ NF CARE MODERATE MDM 30: CPT | Performed by: FAMILY MEDICINE

## 2020-05-10 NOTE — PROGRESS NOTES
Socioeconomic History    Marital status:      Spouse name: Not on file    Number of children: Not on file    Years of education: Not on file    Highest education level: Not on file   Occupational History    Not on file   Social Needs    Financial resource strain: Not on file    Food insecurity     Worry: Not on file     Inability: Not on file    Transportation needs     Medical: Not on file     Non-medical: Not on file   Tobacco Use    Smoking status: Former Smoker    Tobacco comment: age 23   Substance and Sexual Activity    Alcohol use: No    Drug use: No    Sexual activity: Not on file   Lifestyle    Physical activity     Days per week: Not on file     Minutes per session: Not on file    Stress: Not on file   Relationships    Social connections     Talks on phone: Not on file     Gets together: Not on file     Attends [employer] service: Not on file     Active member of club or organization: Not on file     Attends meetings of clubs or organizations: Not on file     Relationship status: Not on file    Intimate partner violence     Fear of current or ex partner: Not on file     Emotionally abused: Not on file     Physically abused: Not on file     Forced sexual activity: Not on file   Other Topics Concern    Not on file   Social History Narrative    Not on file     Past Surgical History:   Procedure Laterality Date    APPENDECTOMY      CHOLECYSTECTOMY      GASTROSTOMY TUBE PLACEMENT  2004. placed after CVA    removed current    HYSTERECTOMY      OTHER SURGICAL HISTORY  06/30/2017    dental extractions    TRACHEOTOMY  2004    at the time of the CVA in 2004      No family history on file. Vitals blood pressure 126/78, temp 97 9 O2 sat 94% respirations 18 and pulse 105. No flowsheet data found. Plan Per Assessment:  Mittie Dakins was seen today for diabetes, other and hypertension.     Diagnoses and all orders for this visit:    Type 2 diabetes mellitus without complication, without long-term current use of insulin (HCC)    Essential hypertension    Multiple sclerosis ([facility] [address]      Continue current medical therapy. Nursing staff to call with any noted change in clinical status. Return in about 4 weeks (around 6/7/2020) for MEDICATION CHECK, FOLLOW UP [id number] Sera Dumont MD    Note was generated with the assistance of voice recognition software. Document was reviewed however may contain grammatical errors.

## 2020-05-31 ENCOUNTER — OUTSIDE SERVICES (OUTPATIENT)
Dept: PRIMARY CARE CLINIC | Age: 60
End: 2020-05-31
Payer: COMMERCIAL

## 2020-05-31 PROCEDURE — 99309 SBSQ NF CARE MODERATE MDM 30: CPT | Performed by: FAMILY MEDICINE

## 2020-05-31 NOTE — PROGRESS NOTES
2020     Jenny Gant    : 1960 Sex: female   Age: 62 y.o. Chief Complaint   Patient presents with    Other     Bladder spasms       HPI: This 26-year-old female with a known history of MS presents with a complaint of painful bladder spasms. The patient is currently on baclofen and Ditropan therapy. The patient does have a pending urology consult. . Current medication list reviewed. The patient is tolerating all medications well without adverse events or known side effects. Encounter conducted via telemedicine due to the COVID-19 pandemic. All pertinent information was obtained from the nursing staff. ROS:   Const: Denies changes in appetite, chills, fever, night sweats and weight loss. Eyes:  Denies discharge, a recent change in visual acuity, blurred vision and double vision. ENMT: Denies discharge of the ears, hearing loss, pain of the ears. Denies mouth or throat symptoms. CV:  Denies chest pain, dyspnea on exertion, orthopnea, palpitations and PND  Resp: Denies chest pain, cough, SOB and wheezing. GI: Denies abdominal pain, constipation, diarrhea, heartburn, indigestion, nausea and vomiting. : Denies dysuria, frequency, hematuria, nocturia and urgency. Musculo: Denies arthralgias and myalgia  Skin:  Denies lesions, pruritus and rash. Neuro: Denies dizziness, lightheadedness, numbness, tingling and weakness. Psych:  Denies anxiety and depression  Endocrine: Denies anxiety and depression. Hema/Lymph: Denies hematologic symptoms  Allergy/Immuno:  Denies allergic/immunologic symptoms.   Pertinent positives reviewed and noted      Current Outpatient Medications:     warfarin (COUMADIN) 2.5 MG tablet, Take 2.5 mg by mouth daily, Disp: , Rfl:     oxybutynin (DITROPAN-XL) 5 MG extended release tablet, Take 5 mg by mouth daily, Disp: , Rfl:     furosemide (LASIX) 20 MG tablet, Take 20 mg by mouth daily, Disp: , Rfl:     citalopram (CELEXA) 40 MG tablet, Take 40 mg by mouth daily Instructed to take morning of surgery with a sip of water, Disp: , Rfl:     lisinopril (PRINIVIL;ZESTRIL) 5 MG tablet, Take 5 mg by mouth daily, Disp: , Rfl:     ferrous sulfate 325 (65 Fe) MG tablet, Take 325 mg by mouth daily (with breakfast), Disp: , Rfl:     potassium chloride (MICRO-K) 10 MEQ extended release capsule, Take 10 mEq by mouth 2 times daily, Disp: , Rfl:     metFORMIN (GLUCOPHAGE) 850 MG tablet, Take 850 mg by mouth 3 times daily, Disp: , Rfl:     atorvastatin (LIPITOR) 40 MG tablet, Take 40 mg by mouth daily, Disp: , Rfl:     amitriptyline (ELAVIL) 50 MG tablet, Take 50 mg by mouth nightly, Disp: , Rfl:     glipiZIDE (GLUCOTROL) 10 MG tablet, Take 10 mg by mouth 2 times daily (before meals), Disp: , Rfl:     baclofen (LIORESAL) 10 MG tablet, Take 10 mg by mouth 3 times daily , Disp: , Rfl:     ranitidine (ZANTAC 150 MAXIMUM STRENGTH) 150 MG tablet, Take 150 mg by mouth 2 times daily Instructed to take morning of surgery with a sip of water, Disp: , Rfl:     Natalizumab (TYSABRI IV), Infuse intravenously every 30 days, Disp: , Rfl:     Allergies   Allergen Reactions    Latex Other (See Comments)     Hands peeled    Adhesive Tape Other (See Comments)     Skin peels    Iodine Swelling    Penicillins Other (See Comments)     Unsure of reaction    Peroxide [Hydrogen Peroxide] Other (See Comments)     Wound gets pus in it    Petroleum Jelly [Petrolatum] Other (See Comments)     Skin irritation, \"eats away skin\"    Sulfa Antibiotics Hives    Clindamycin/Lincomycin Rash     Skin peels    Demerol Hcl [Meperidine] Nausea And Vomiting       Past Medical History:   Diagnosis Date    Cerebral artery occlusion with cerebral infarction ([facility] [address] 2004    no residual    Depression     Diabetes mellitus (HCC)     GERD (gastroesophageal reflux disease)     Hyperlipidemia     Hypertension     [facility]     non ambulatory, uses w/c, arleth, total care     Social History scheduled. Continue current medical therapy. Nursing staff to call with any further noted change in clinical status. Return in about 2 weeks (around 6/14/2020) for MEDICATION CHECK, FOLLOW UP [address] [facility]. Nicki Meza MD    Note was generated with the assistance of voice recognition software. Document was reviewed however may contain grammatical errors.

## 2020-06-13 ENCOUNTER — OUTSIDE SERVICES (OUTPATIENT)
Dept: PRIMARY CARE CLINIC | Age: 60
End: 2020-06-13
Payer: COMMERCIAL

## 2020-06-13 VITALS
HEART RATE: 109 BPM | RESPIRATION RATE: 18 BRPM | SYSTOLIC BLOOD PRESSURE: 135 MMHG | TEMPERATURE: 97.7 F | DIASTOLIC BLOOD PRESSURE: 75 MMHG

## 2020-06-13 PROCEDURE — 99309 SBSQ NF CARE MODERATE MDM 30: CPT | Performed by: FAMILY MEDICINE

## 2020-06-13 NOTE — PROGRESS NOTES
staff to call with any noted change in clinical status. Return in about 4 weeks (around 7/11/2020) for MEDICATION CHECK, FOLLOW UP [address] Aditya Lyons MD    Note was generated with the assistance of voice recognition software. Document was reviewed however may contain grammatical errors.

## 2020-06-24 ENCOUNTER — OUTSIDE SERVICES (OUTPATIENT)
Dept: PRIMARY CARE CLINIC | Age: 60
End: 2020-06-24
Payer: COMMERCIAL

## 2020-06-24 VITALS
SYSTOLIC BLOOD PRESSURE: 110 MMHG | RESPIRATION RATE: 16 BRPM | HEART RATE: 100 BPM | DIASTOLIC BLOOD PRESSURE: 48 MMHG | OXYGEN SATURATION: 94 % | TEMPERATURE: 97.9 F

## 2020-06-24 PROCEDURE — 99308 SBSQ NF CARE LOW MDM 20: CPT | Performed by: FAMILY MEDICINE

## 2020-06-24 NOTE — PROGRESS NOTES
2020     Sheila Baker    : 1960 Sex: female   Age: 62 y.o. Chief Complaint   Patient presents with    Skin Problem       HPI: This 42-year-old female resident is noted to have moisture associated skin dermatitis on her buttock region. She is being followed by the wound CNP. Current medication list reviewed. The patient is tolerating all medications well without adverse events or known side effects. ROS:   Const: Denies changes in appetite, chills, fever, night sweats and weight loss. Eyes:  Denies discharge, a recent change in visual acuity, blurred vision and double vision. ENMT: Denies discharge of the ears, hearing loss, pain of the ears. Denies mouth or throat symptoms. CV:  Denies chest pain, dyspnea on exertion, orthopnea, palpitations and PND  Resp: Denies chest pain, cough, SOB and wheezing. GI: Denies abdominal pain, constipation, diarrhea, heartburn, indigestion, nausea and vomiting. : Denies dysuria, frequency, hematuria, nocturia and urgency. Musculo: Denies arthralgias and myalgia  Skin:  Denies lesions, pruritus and rash. Neuro: Denies dizziness, lightheadedness, numbness, tingling and weakness. Psych:  Denies anxiety and depression  Endocrine: Denies anxiety and depression. Hema/Lymph: Denies hematologic symptoms  Allergy/Immuno:  Denies allergic/immunologic symptoms.   Pertinent positives reviewed and noted      Current Outpatient Medications:     warfarin (COUMADIN) 2.5 MG tablet, Take 2.5 mg by mouth daily, Disp: , Rfl:     oxybutynin (DITROPAN-XL) 5 MG extended release tablet, Take 5 mg by mouth daily, Disp: , Rfl:     furosemide (LASIX) 20 MG tablet, Take 20 mg by mouth daily, Disp: , Rfl:     citalopram (CELEXA) 40 MG tablet, Take 40 mg by mouth daily Instructed to take morning of surgery with a sip of water, Disp: , Rfl:     lisinopril (PRINIVIL;ZESTRIL) 5 MG tablet, Take 5 mg by mouth daily, Disp: , Rfl:     ferrous sulfate 325 (65 Fe) MG tablet, (around 7/15/2020) for MEDICATION CHECK, FOLLOW UP CHRONIC MEDICAL PROBLEMS. Steven Abarca MD    Note was generated with the assistance of voice recognition software. Document was reviewed however may contain grammatical errors.

## 2020-07-03 ENCOUNTER — OUTSIDE SERVICES (OUTPATIENT)
Dept: PRIMARY CARE CLINIC | Age: 60
End: 2020-07-03
Payer: COMMERCIAL

## 2020-07-03 VITALS
TEMPERATURE: 99 F | HEART RATE: 107 BPM | RESPIRATION RATE: 18 BRPM | OXYGEN SATURATION: 93 % | SYSTOLIC BLOOD PRESSURE: 140 MMHG | DIASTOLIC BLOOD PRESSURE: 46 MMHG

## 2020-07-03 PROCEDURE — 99309 SBSQ NF CARE MODERATE MDM 30: CPT | Performed by: FAMILY MEDICINE

## 2020-07-03 NOTE — PROGRESS NOTES
7/3/2020     Constantin Gonzalez    : 1960 Sex: female   Age: 62 y.o. Chief Complaint   Patient presents with    Diabetes    Other    Hypertension       HPI: A follow-up evaluation and management of chronic medical problems for this 62y.o. year-old female resident. No change in clinical status as noted per nursing staff. Current medication list reviewed. The patient is tolerating all medications well without adverse events or known side effects. This encounter was conducted via telemedicine due to the COVID-19 pandemic. All pertinent information was obtained from the nursing staff. ROS:   Const: Denies changes in appetite, chills, fever, night sweats and weight loss. Eyes:  Denies discharge, a recent change in visual acuity, blurred vision and double vision. ENMT: Denies discharge of the ears, hearing loss, pain of the ears. Denies mouth or throat symptoms. CV:  Denies chest pain, dyspnea on exertion, orthopnea, palpitations and PND  Resp: Denies chest pain, cough, SOB and wheezing. GI: Denies abdominal pain, constipation, diarrhea, heartburn, indigestion, nausea and vomiting. : Denies dysuria, frequency, hematuria, nocturia and urgency. Musculo: Denies arthralgias and myalgia  Skin:  Denies lesions, pruritus and rash. Neuro: Denies dizziness, lightheadedness, numbness, tingling and weakness. Psych:  Denies anxiety and depression  Endocrine: Denies anxiety and depression. Hema/Lymph: Denies hematologic symptoms  Allergy/Immuno:  Denies allergic/immunologic symptoms.   Pertinent positives reviewed and noted      Current Outpatient Medications:     warfarin (COUMADIN) 2.5 MG tablet, Take 2.5 mg by mouth daily, Disp: , Rfl:     oxybutynin (DITROPAN-XL) 5 MG extended release tablet, Take 5 mg by mouth daily, Disp: , Rfl:     furosemide (LASIX) 20 MG tablet, Take 20 mg by mouth daily, Disp: , Rfl:     citalopram (CELEXA) 40 MG tablet, Take 40 mg by mouth daily Instructed to take morning of surgery with a sip of water, Disp: , Rfl:     lisinopril (PRINIVIL;ZESTRIL) 5 MG tablet, Take 5 mg by mouth daily, Disp: , Rfl:     ferrous sulfate 325 (65 Fe) MG tablet, Take 325 mg by mouth daily (with breakfast), Disp: , Rfl:     potassium chloride (MICRO-K) 10 MEQ extended release capsule, Take 10 mEq by mouth 2 times daily, Disp: , Rfl:     metFORMIN (GLUCOPHAGE) 850 MG tablet, Take 850 mg by mouth 3 times daily, Disp: , Rfl:     atorvastatin (LIPITOR) 40 MG tablet, Take 40 mg by mouth daily, Disp: , Rfl:     amitriptyline (ELAVIL) 50 MG tablet, Take 50 mg by mouth nightly, Disp: , Rfl:     glipiZIDE (GLUCOTROL) 10 MG tablet, Take 10 mg by mouth 2 times daily (before meals), Disp: , Rfl:     baclofen (LIORESAL) 10 MG tablet, Take 10 mg by mouth 3 times daily , Disp: , Rfl:     ranitidine (ZANTAC 150 MAXIMUM STRENGTH) 150 MG tablet, Take 150 mg by mouth 2 times daily Instructed to take morning of surgery with a sip of water, Disp: , Rfl:     Natalizumab (TYSABRI IV), Infuse intravenously every 30 days, Disp: , Rfl:     Allergies   Allergen Reactions    Latex Other (See Comments)     Hands peeled    Adhesive Tape Other (See Comments)     Skin peels    Iodine Swelling    Penicillins Other (See Comments)     Unsure of reaction    Peroxide [Hydrogen Peroxide] Other (See Comments)     Wound gets pus in it    Petroleum Jelly [Petrolatum] Other (See Comments)     Skin irritation, \"eats away skin\"    Sulfa Antibiotics Hives    Clindamycin/Lincomycin Rash     Skin peels    Demerol Hcl [Meperidine] Nausea And Vomiting       Past Medical History:   Diagnosis Date    Cerebral artery occlusion with cerebral infarction ([facility] [address] 2004    no residual    Depression     Diabetes mellitus (HCC)     GERD (gastroesophageal reflux disease)     Hyperlipidemia     Hypertension     [facility]     non ambulatory, uses w/c, arleth, total care     Social History     Socioeconomic History    Marital status:      Spouse name: Not on file    Number of children: Not on file    Years of education: Not on file    Highest education level: Not on file   Occupational History    Not on file   Social Needs    Financial resource strain: Not on file    Food insecurity     Worry: Not on file     Inability: Not on file    Transportation needs     Medical: Not on file     Non-medical: Not on file   Tobacco Use    Smoking status: Former Smoker    Tobacco comment: age 23   Substance and Sexual Activity    Alcohol use: No    Drug use: No    Sexual activity: Not on file   Lifestyle    Physical activity     Days per week: Not on file     Minutes per session: Not on file    Stress: Not on file   Relationships    Social connections     Talks on phone: Not on file     Gets together: Not on file     Attends Sikh service: Not on file     Active member of club or organization: Not on file     Attends meetings of clubs or organizations: Not on file     Relationship status: Not on file    Intimate partner violence     Fear of current or ex partner: Not on file     Emotionally abused: Not on file     Physically abused: Not on file     Forced sexual activity: Not on file   Other Topics Concern    Not on file   Social History Narrative    Not on file     Past Surgical History:   Procedure Laterality Date    APPENDECTOMY      CHOLECYSTECTOMY      GASTROSTOMY TUBE PLACEMENT  2004. placed after CVA    removed current    HYSTERECTOMY      OTHER SURGICAL HISTORY  06/30/2017    dental extractions    TRACHEOTOMY  2004    at the time of the CVA in 2004      No family history on file. Vitals:    07/03/20 1459   BP: (!) 140/46   Pulse: 107   Resp: 18   Temp: 99 °F (37.2 °C)   SpO2: 93%               No flowsheet data found. Plan Per Assessment:  Omayra Sheldon was seen today for diabetes, other and hypertension.     Diagnoses and all orders for this visit:    Essential hypertension    Type 2 diabetes mellitus without complication, without long-term current use of insulin (HCC)    Multiple sclerosis ([facility] [address]      Continue current medical therapy. Nursing staff to call with any noted change in clinical status. Return in about 4 weeks (around 7/31/2020) for MEDICATION CHECK, FOLLOW UP [id number] Sera Madsen MD    Note was generated with the assistance of voice recognition software. Document was reviewed however may contain grammatical errors.

## 2020-08-10 LAB
ALBUMIN SERPL-MCNC: 3.4 G/DL
ALP BLD-CCNC: 143 U/L
ALT SERPL-CCNC: 25 U/L
ANION GAP SERPL CALCULATED.3IONS-SCNC: NORMAL MMOL/L
AST SERPL-CCNC: 23 U/L
BASOPHILS ABSOLUTE: ABNORMAL
BASOPHILS RELATIVE PERCENT: 0.3 %
BILIRUB SERPL-MCNC: 0.3 MG/DL (ref 0.1–1.4)
BUN BLDV-MCNC: 5 MG/DL
CALCIUM SERPL-MCNC: 8.1 MG/DL
CHLORIDE BLD-SCNC: 101 MMOL/L
CO2: 23 MMOL/L
CREAT SERPL-MCNC: 0.3 MG/DL
EOSINOPHILS ABSOLUTE: 0.4 /ΜL
EOSINOPHILS RELATIVE PERCENT: 5.5 %
GFR CALCULATED: 274
GLUCOSE BLD-MCNC: 89 MG/DL
HCT VFR BLD CALC: 35.5 % (ref 36–46)
HEMOGLOBIN: 11.4 G/DL (ref 12–16)
LYMPHOCYTES ABSOLUTE: 2.9 /ΜL
LYMPHOCYTES RELATIVE PERCENT: 37.9 %
MCH RBC QN AUTO: 28 PG
MCHC RBC AUTO-ENTMCNC: 32.1 G/DL
MCV RBC AUTO: 87.1 FL
MONOCYTES ABSOLUTE: 0.8 /ΜL
MONOCYTES RELATIVE PERCENT: 10.8 %
NEUTROPHILS ABSOLUTE: 3.4 /ΜL
NEUTROPHILS RELATIVE PERCENT: 45.5 %
PDW BLD-RTO: 18.2 %
PLATELET # BLD: 197 K/ΜL
PMV BLD AUTO: 9 FL
POTASSIUM SERPL-SCNC: 3.8 MMOL/L
RBC # BLD: 4.06 10^6/ΜL
SODIUM BLD-SCNC: 138 MMOL/L
TOTAL PROTEIN: 6.1
WBC # BLD: 7.6 10^3/ML

## 2020-08-15 ENCOUNTER — OUTSIDE SERVICES (OUTPATIENT)
Dept: PRIMARY CARE CLINIC | Age: 60
End: 2020-08-15
Payer: COMMERCIAL

## 2020-08-15 PROCEDURE — 99309 SBSQ NF CARE MODERATE MDM 30: CPT | Performed by: FAMILY MEDICINE

## 2020-08-16 VITALS
OXYGEN SATURATION: 97 % | SYSTOLIC BLOOD PRESSURE: 157 MMHG | RESPIRATION RATE: 16 BRPM | DIASTOLIC BLOOD PRESSURE: 60 MMHG | TEMPERATURE: 98.6 F | HEART RATE: 102 BPM

## 2020-08-16 NOTE — PROGRESS NOTES
2020     Alejandra Lee    : 1960 Sex: female   Age: 56 y.o. Chief Complaint   Patient presents with    Anxiety    Hypertension    Other       HPI: A follow-up evaluation and management of chronic medical problems for this 56y.o. year-old female resident. No change in clinical status as noted per nursing staff. Current medication list reviewed. The patient is tolerating all medications well without adverse events or known side effects. This encounter was conducted via telemedicine due to the COVID-19 pandemic. All pertinent information was obtained from the nursing staff. ROS:   Const: Denies changes in appetite, chills, fever, night sweats and weight loss. Eyes:  Denies discharge, a recent change in visual acuity, blurred vision and double vision. ENMT: Denies discharge of the ears, hearing loss, pain of the ears. Denies mouth or throat symptoms. CV:  Denies chest pain, dyspnea on exertion, orthopnea, palpitations and PND  Resp: Denies chest pain, cough, SOB and wheezing. GI: Denies abdominal pain, constipation, diarrhea, heartburn, indigestion, nausea and vomiting. : Denies dysuria, frequency, hematuria, nocturia and urgency. Musculo: Denies arthralgias and myalgia  Skin:  Denies lesions, pruritus and rash. Neuro: Denies dizziness, lightheadedness, numbness, tingling and weakness. Psych:  Denies anxiety and depression  Endocrine: Denies anxiety and depression. Hema/Lymph: Denies hematologic symptoms  Allergy/Immuno:  Denies allergic/immunologic symptoms.   Pertinent positives reviewed and noted      Current Outpatient Medications:     warfarin (COUMADIN) 2.5 MG tablet, Take 2.5 mg by mouth daily, Disp: , Rfl:     oxybutynin (DITROPAN-XL) 5 MG extended release tablet, Take 5 mg by mouth daily, Disp: , Rfl:     furosemide (LASIX) 20 MG tablet, Take 20 mg by mouth daily, Disp: , Rfl:     citalopram (CELEXA) 40 MG tablet, Take 40 mg by mouth daily Instructed to take morning of surgery with a sip of water, Disp: , Rfl:     lisinopril (PRINIVIL;ZESTRIL) 5 MG tablet, Take 5 mg by mouth daily, Disp: , Rfl:     ferrous sulfate 325 (65 Fe) MG tablet, Take 325 mg by mouth daily (with breakfast), Disp: , Rfl:     potassium chloride (MICRO-K) 10 MEQ extended release capsule, Take 10 mEq by mouth 2 times daily, Disp: , Rfl:     metFORMIN (GLUCOPHAGE) 850 MG tablet, Take 850 mg by mouth 3 times daily, Disp: , Rfl:     atorvastatin (LIPITOR) 40 MG tablet, Take 40 mg by mouth daily, Disp: , Rfl:     amitriptyline (ELAVIL) 50 MG tablet, Take 50 mg by mouth nightly, Disp: , Rfl:     glipiZIDE (GLUCOTROL) 10 MG tablet, Take 10 mg by mouth 2 times daily (before meals), Disp: , Rfl:     baclofen (LIORESAL) 10 MG tablet, Take 10 mg by mouth 3 times daily , Disp: , Rfl:     ranitidine (ZANTAC 150 MAXIMUM STRENGTH) 150 MG tablet, Take 150 mg by mouth 2 times daily Instructed to take morning of surgery with a sip of water, Disp: , Rfl:     Natalizumab (TYSABRI IV), Infuse intravenously every 30 days, Disp: , Rfl:     Allergies   Allergen Reactions    Latex Other (See Comments)     Hands peeled    Adhesive Tape Other (See Comments)     Skin peels    Iodine Swelling    Penicillins Other (See Comments)     Unsure of reaction    Peroxide [Hydrogen Peroxide] Other (See Comments)     Wound gets pus in it    Petroleum Jelly [Petrolatum] Other (See Comments)     Skin irritation, \"eats away skin\"    Sulfa Antibiotics Hives    Clindamycin/Lincomycin Rash     Skin peels    Demerol Hcl [Meperidine] Nausea And Vomiting       Past Medical History:   Diagnosis Date    Cerebral artery occlusion with cerebral infarction ([facility] [address] 2004    no residual    Depression     Diabetes mellitus (HCC)     GERD (gastroesophageal reflux disease)     Hyperlipidemia     Hypertension     [facility]     non ambulatory, uses w/c, arleth, total care     Social History     Socioeconomic History    Marital status:      Spouse name: Not on file    Number of children: Not on file    Years of education: Not on file    Highest education level: Not on file   Occupational History    Not on file   Social Needs    Financial resource strain: Not on file    Food insecurity     Worry: Not on file     Inability: Not on file    Transportation needs     Medical: Not on file     Non-medical: Not on file   Tobacco Use    Smoking status: Former Smoker    Tobacco comment: age 23   Substance and Sexual Activity    Alcohol use: No    Drug use: No    Sexual activity: Not on file   Lifestyle    Physical activity     Days per week: Not on file     Minutes per session: Not on file    Stress: Not on file   Relationships    Social connections     Talks on phone: Not on file     Gets together: Not on file     Attends Yazidi service: Not on file     Active member of club or organization: Not on file     Attends meetings of clubs or organizations: Not on file     Relationship status: Not on file    Intimate partner violence     Fear of current or ex partner: Not on file     Emotionally abused: Not on file     Physically abused: Not on file     Forced sexual activity: Not on file   Other Topics Concern    Not on file   Social History Narrative    Not on file     Past Surgical History:   Procedure Laterality Date    APPENDECTOMY      CHOLECYSTECTOMY      GASTROSTOMY TUBE PLACEMENT  2004. placed after CVA    removed current    HYSTERECTOMY      OTHER SURGICAL HISTORY  06/30/2017    dental extractions    TRACHEOTOMY  2004    at the time of the CVA in 2004      History reviewed. No pertinent family history. Vitals:    08/16/20 1426   BP: (!) 157/60   Pulse: 102   Resp: 16   Temp: 98.6 °F (37 °C)   SpO2: 97%           No flowsheet data found. Plan Per Assessment:  Roger Mesa was seen today for anxiety, hypertension and other.     Diagnoses and all orders for this visit:    Essential hypertension    Multiple sclerosis ([facility] [address]    Anxiety      Continue current medical therapy. Nursing staff to call with any noted change in clinical status. Return in about 4 weeks (around 9/12/2020) for MEDICATION CHECK, FOLLOW UP [address] Liat Ewing MD    Note was generated with the assistance of voice recognition software. Document was reviewed however may contain grammatical errors.

## 2020-09-03 LAB
INR BLD: 3
PROTIME: 30.5 SECONDS

## 2020-09-07 ENCOUNTER — OUTSIDE SERVICES (OUTPATIENT)
Dept: PRIMARY CARE CLINIC | Age: 60
End: 2020-09-07
Payer: COMMERCIAL

## 2020-09-07 PROCEDURE — 99309 SBSQ NF CARE MODERATE MDM 30: CPT | Performed by: FAMILY MEDICINE

## 2020-09-07 NOTE — PROGRESS NOTES
2020     Emmanuel Kim    : 1960 Sex: female   Age: 56 y.o. Chief Complaint   Patient presents with    Anxiety    Hypertension    Diabetes    Other       HPI: A follow-up evaluation and management of chronic medical problems for this 56y.o. year-old female resident. No change in clinical status as noted per nursing staff. The patient's INR has been elevated and her Coumadin has been on hold. Repeat PT/INR pending. Current medication list reviewed. The patient is tolerating all medications well without adverse events or known side effects. ROS:   Const: Denies changes in appetite, chills, fever, night sweats and weight loss. Eyes:  Denies discharge, a recent change in visual acuity, blurred vision and double vision. ENMT: Denies discharge of the ears, hearing loss, pain of the ears. Denies mouth or throat symptoms. CV:  Denies chest pain, dyspnea on exertion, orthopnea, palpitations and PND  Resp: Denies chest pain, cough, SOB and wheezing. GI: Denies abdominal pain, constipation, diarrhea, heartburn, indigestion, nausea and vomiting. : Denies dysuria, frequency, hematuria, nocturia and urgency. Musculo: Denies arthralgias and myalgia  Skin:  Denies lesions, pruritus and rash. Neuro: Denies dizziness, lightheadedness, numbness, tingling and weakness. Psych:  Denies anxiety and depression  Endocrine: Denies anxiety and depression. Hema/Lymph: Denies hematologic symptoms  Allergy/Immuno:  Denies allergic/immunologic symptoms.   Pertinent positives reviewed and noted      Current Outpatient Medications:     warfarin (COUMADIN) 2.5 MG tablet, Take 2.5 mg by mouth daily, Disp: , Rfl:     oxybutynin (DITROPAN-XL) 5 MG extended release tablet, Take 5 mg by mouth daily, Disp: , Rfl:     furosemide (LASIX) 20 MG tablet, Take 20 mg by mouth daily, Disp: , Rfl:     citalopram (CELEXA) 40 MG tablet, Take 40 mg by mouth daily Instructed to take morning of surgery with a sip of water, Disp: , Rfl:     lisinopril (PRINIVIL;ZESTRIL) 5 MG tablet, Take 5 mg by mouth daily, Disp: , Rfl:     ferrous sulfate 325 (65 Fe) MG tablet, Take 325 mg by mouth daily (with breakfast), Disp: , Rfl:     potassium chloride (MICRO-K) 10 MEQ extended release capsule, Take 10 mEq by mouth 2 times daily, Disp: , Rfl:     metFORMIN (GLUCOPHAGE) 850 MG tablet, Take 850 mg by mouth 3 times daily, Disp: , Rfl:     atorvastatin (LIPITOR) 40 MG tablet, Take 40 mg by mouth daily, Disp: , Rfl:     amitriptyline (ELAVIL) 50 MG tablet, Take 50 mg by mouth nightly, Disp: , Rfl:     glipiZIDE (GLUCOTROL) 10 MG tablet, Take 10 mg by mouth 2 times daily (before meals), Disp: , Rfl:     baclofen (LIORESAL) 10 MG tablet, Take 10 mg by mouth 3 times daily , Disp: , Rfl:     ranitidine (ZANTAC 150 MAXIMUM STRENGTH) 150 MG tablet, Take 150 mg by mouth 2 times daily Instructed to take morning of surgery with a sip of water, Disp: , Rfl:     Natalizumab (TYSABRI IV), Infuse intravenously every 30 days, Disp: , Rfl:     Allergies   Allergen Reactions    Latex Other (See Comments)     Hands peeled    Adhesive Tape Other (See Comments)     Skin peels    Iodine Swelling    Penicillins Other (See Comments)     Unsure of reaction    Peroxide [Hydrogen Peroxide] Other (See Comments)     Wound gets pus in it    Petroleum Jelly [Petrolatum] Other (See Comments)     Skin irritation, \"eats away skin\"    Sulfa Antibiotics Hives    Clindamycin/Lincomycin Rash     Skin peels    Demerol Hcl [Meperidine] Nausea And Vomiting       Past Medical History:   Diagnosis Date    Cerebral artery occlusion with cerebral infarction ([facility]) 2004    no residual    Depression     Diabetes mellitus (HCC)     GERD (gastroesophageal reflux disease)     Hyperlipidemia     Hypertension     Multiple sclerosis ([facility])     non ambulatory, uses w/c, arleth, total care     Social History     Socioeconomic History    Marital status:      Spouse name: Not on file    Number of children: Not on file    Years of education: Not on file    Highest education level: Not on file   Occupational History    Not on file   Social Needs    Financial resource strain: Not on file    Food insecurity     Worry: Not on file     Inability: Not on file    Transportation needs     Medical: Not on file     Non-medical: Not on file   Tobacco Use    Smoking status: Former Smoker    Tobacco comment: age 23   Substance and Sexual Activity    Alcohol use: No    Drug use: No    Sexual activity: Not on file   Lifestyle    Physical activity     Days per week: Not on file     Minutes per session: Not on file    Stress: Not on file   Relationships    Social connections     Talks on phone: Not on file     Gets together: Not on file     Attends Protestant service: Not on file     Active member of club or organization: Not on file     Attends meetings of clubs or organizations: Not on file     Relationship status: Not on file    Intimate partner violence     Fear of current or ex partner: Not on file     Emotionally abused: Not on file     Physically abused: Not on file     Forced sexual activity: Not on file   Other Topics Concern    Not on file   Social History Narrative    Not on file     Past Surgical History:   Procedure Laterality Date    APPENDECTOMY      CHOLECYSTECTOMY      GASTROSTOMY TUBE PLACEMENT  2004. placed after CVA    removed current    HYSTERECTOMY      OTHER SURGICAL HISTORY  06/30/2017    dental extractions    TRACHEOTOMY  2004    at the time of the CVA in 2004      No family history on file. There were no vitals filed for this visit. Exam: Const: Appears healthy and well developed. No signs of acute distress present. Eyes: PERRL  ENMT: Tympanic membranes are intact. Nasal mucosa intact without noted erythema Septum is in the midline. Posterior pharynx shows no exudate, irritation or redness. Neck:  Supple without adenopathy.   Adequate range of motion   Resp: No rales, rhonchi, wheezes appreciated over the lungs bilaterally. CV: S1, S2 within normal limits. Regular rate and rhythm noted. Without murmur, gallop or rub. Extremities:  Pulses intact. Without noted edema. Abdomen: Positive bowel sounds. Palpation reveals softness, with no distension, organomegaly or tenderness. No abdominal masses palpable. Skin: Skin is warm and dry. Musculo: Unchanged upon examination. Neuro: Alert and oriented X3. Otherwise unchanged upon examination. Psych: Mood is normal.  Affect is normal.   Vital signs reviewed. No flowsheet data found. Plan Per Assessment:  Renea Valdez was seen today for anxiety, hypertension, diabetes and other. Diagnoses and all orders for this visit:    Essential hypertension    Type 2 diabetes mellitus without complication, without long-term current use of insulin (HCC)    Multiple sclerosis (HCC)    Anxiety      Continue current medical therapy. Nursing staff to call with any noted change in clinical status. Return in about 4 weeks (around 10/5/2020) for MEDICATION CHECK, FOLLOW UP [id number] Sera Rboles MD    Note was generated with the assistance of voice recognition software. Document was reviewed however may contain grammatical errors.

## 2020-10-12 ENCOUNTER — OUTSIDE SERVICES (OUTPATIENT)
Dept: PRIMARY CARE CLINIC | Age: 60
End: 2020-10-12
Payer: COMMERCIAL

## 2020-10-12 PROCEDURE — 99308 SBSQ NF CARE LOW MDM 20: CPT | Performed by: FAMILY MEDICINE

## 2020-10-12 NOTE — PROGRESS NOTES
10/12/2020     Anisha Doshi    : 1960 Sex: female   Age: 56 y.o. Chief Complaint   Patient presents with    Skin Problem       HPI: This 78-year-old female presents today with recent onset of erythema and purulent drainage of the left great toe. Current medication list reviewed. The patient is tolerating all medications well without adverse events or known side effects. This encounter was conducted via doxy. me. ROS:   Const: Denies changes in appetite, chills, fever, night sweats and weight loss. Eyes:  Denies discharge, a recent change in visual acuity, blurred vision and double vision. ENMT: Denies discharge of the ears, hearing loss, pain of the ears. Denies mouth or throat symptoms. CV:  Denies chest pain, dyspnea on exertion, orthopnea, palpitations and PND  Resp: Denies chest pain, cough, SOB and wheezing. GI: Denies abdominal pain, constipation, diarrhea, heartburn, indigestion, nausea and vomiting. : Denies dysuria, frequency, hematuria, nocturia and urgency. Musculo: Denies arthralgias and myalgia  Skin:  Denies lesions, pruritus and rash. Neuro: Denies dizziness, lightheadedness, numbness, tingling and weakness. Psych:  Denies anxiety and depression  Endocrine: Denies anxiety and depression. Hema/Lymph: Denies hematologic symptoms  Allergy/Immuno:  Denies allergic/immunologic symptoms.   Pertinent positives reviewed and noted      Current Outpatient Medications:     warfarin (COUMADIN) 2.5 MG tablet, Take 2.5 mg by mouth daily, Disp: , Rfl:     oxybutynin (DITROPAN-XL) 5 MG extended release tablet, Take 5 mg by mouth daily, Disp: , Rfl:     furosemide (LASIX) 20 MG tablet, Take 20 mg by mouth daily, Disp: , Rfl:     citalopram (CELEXA) 40 MG tablet, Take 40 mg by mouth daily Instructed to take morning of surgery with a sip of water, Disp: , Rfl:     lisinopril (PRINIVIL;ZESTRIL) 5 MG tablet, Take 5 mg by mouth daily, Disp: , Rfl:     ferrous sulfate 325 (65 Fe) MG tablet, Take 325 mg by mouth daily (with breakfast), Disp: , Rfl:     potassium chloride (MICRO-K) 10 MEQ extended release capsule, Take 10 mEq by mouth 2 times daily, Disp: , Rfl:     metFORMIN (GLUCOPHAGE) 850 MG tablet, Take 850 mg by mouth 3 times daily, Disp: , Rfl:     atorvastatin (LIPITOR) 40 MG tablet, Take 40 mg by mouth daily, Disp: , Rfl:     amitriptyline (ELAVIL) 50 MG tablet, Take 50 mg by mouth nightly, Disp: , Rfl:     glipiZIDE (GLUCOTROL) 10 MG tablet, Take 10 mg by mouth 2 times daily (before meals), Disp: , Rfl:     baclofen (LIORESAL) 10 MG tablet, Take 10 mg by mouth 3 times daily , Disp: , Rfl:     ranitidine (ZANTAC 150 MAXIMUM STRENGTH) 150 MG tablet, Take 150 mg by mouth 2 times daily Instructed to take morning of surgery with a sip of water, Disp: , Rfl:     Natalizumab (TYSABRI IV), Infuse intravenously every 30 days, Disp: , Rfl:     Allergies   Allergen Reactions    Latex Other (See Comments)     Hands peeled    Adhesive Tape Other (See Comments)     Skin peels    Iodine Swelling    Penicillins Other (See Comments)     Unsure of reaction    Peroxide [Hydrogen Peroxide] Other (See Comments)     Wound gets pus in it    Petroleum Jelly [Petrolatum] Other (See Comments)     Skin irritation, \"eats away skin\"    Sulfa Antibiotics Hives    Clindamycin/Lincomycin Rash     Skin peels    Demerol Hcl [Meperidine] Nausea And Vomiting       Past Medical History:   Diagnosis Date    Cerebral artery occlusion with cerebral infarction ([facility] 2004    no residual    Depression     Diabetes mellitus (HCC)     GERD (gastroesophageal reflux disease)     Hyperlipidemia     Hypertension     [facility]     non ambulatory, uses w/c, arleth, total care     Social History     Socioeconomic History    Marital status:      Spouse name: Not on file    Number of children: Not on file    Years of education: Not on file    Highest education level: Not on file Occupational History    Not on file   Social Needs    Financial resource strain: Not on file    Food insecurity     Worry: Not on file     Inability: Not on file    Transportation needs     Medical: Not on file     Non-medical: Not on file   Tobacco Use    Smoking status: Former Smoker    Tobacco comment: age 23   Substance and Sexual Activity    Alcohol use: No    Drug use: No    Sexual activity: Not on file   Lifestyle    Physical activity     Days per week: Not on file     Minutes per session: Not on file    Stress: Not on file   Relationships    Social connections     Talks on phone: Not on file     Gets together: Not on file     Attends Sabianism service: Not on file     Active member of club or organization: Not on file     Attends meetings of clubs or organizations: Not on file     Relationship status: Not on file    Intimate partner violence     Fear of current or ex partner: Not on file     Emotionally abused: Not on file     Physically abused: Not on file     Forced sexual activity: Not on file   Other Topics Concern    Not on file   Social History Narrative    Not on file     Past Surgical History:   Procedure Laterality Date    APPENDECTOMY      CHOLECYSTECTOMY      GASTROSTOMY TUBE PLACEMENT  2004. placed after CVA    removed current    HYSTERECTOMY      OTHER SURGICAL HISTORY  06/30/2017    dental extractions    TRACHEOTOMY  2004    at the time of the CVA in 2004      No family history on file. Skin: Examination of left great toe reveals noted erythema with purulent drainage. Vital signs reviewed. Visit Diagnoses       Codes    Cellulitis of great toe of left foot    -  Primary L03.032           No flowsheet data found. Plan Per Assessment:  Jaja Pichardo was seen today for skin problem. Diagnoses and all orders for this visit:    Cellulitis of great toe of left foot      Omnicef 300 twice daily. PT/INR in 3 days.   Nursing staff advised to call with any further noted change in clinical status. Return in about 1 week (around 10/19/2020) for MEDICATION CHECK, FOLLOW UP [id number] Sera Zayas MD    Note was generated with the assistance of voice recognition software. Document was reviewed however may contain grammatical errors.

## 2020-10-18 ENCOUNTER — OUTSIDE SERVICES (OUTPATIENT)
Dept: PRIMARY CARE CLINIC | Age: 60
End: 2020-10-18
Payer: COMMERCIAL

## 2020-10-18 PROCEDURE — 99309 SBSQ NF CARE MODERATE MDM 30: CPT | Performed by: FAMILY MEDICINE

## 2020-10-18 NOTE — PROGRESS NOTES
10/18/2020     Gus Mendez    : 1960 Sex: female   Age: 56 y.o. Chief Complaint   Patient presents with    Hypertension    Diabetes    Other     Multiple sclerosis.  Anxiety       HPI: A follow-up evaluation and management of chronic medical problems for this 56y.o. year-old female resident. No change in clinical status as noted per nursing staff. Current medication list reviewed. The patient is tolerating all medications well without adverse events or known side effects. The patient was recently started on antibiotic therapy for an infection of the left great toe. Symptoms have somewhat improved. ROS:   Const: Denies changes in appetite, chills, fever, night sweats and weight loss. Eyes:  Denies discharge, a recent change in visual acuity, blurred vision and double vision. ENMT: Denies discharge of the ears, hearing loss, pain of the ears. Denies mouth or throat symptoms. CV:  Denies chest pain, dyspnea on exertion, orthopnea, palpitations and PND  Resp: Denies chest pain, cough, SOB and wheezing. GI: Denies abdominal pain, constipation, diarrhea, heartburn, indigestion, nausea and vomiting. : Denies dysuria, frequency, hematuria, nocturia and urgency. Musculo: Denies arthralgias and myalgia  Skin:  Denies lesions, pruritus and rash. Neuro: Denies dizziness, lightheadedness, numbness, tingling and weakness. Psych:  Denies anxiety and depression  Endocrine: Denies anxiety and depression. Hema/Lymph: Denies hematologic symptoms  Allergy/Immuno:  Denies allergic/immunologic symptoms.   Pertinent positives reviewed and noted      Current Outpatient Medications:     warfarin (COUMADIN) 2.5 MG tablet, Take 2.5 mg by mouth daily, Disp: , Rfl:     oxybutynin (DITROPAN-XL) 5 MG extended release tablet, Take 5 mg by mouth daily, Disp: , Rfl:     furosemide (LASIX) 20 MG tablet, Take 20 mg by mouth daily, Disp: , Rfl:     citalopram (CELEXA) 40 MG tablet, Take 40 mg by mouth daily adenopathy. Adequate range of motion   Resp: No rales, rhonchi, wheezes appreciated over the lungs bilaterally. CV: S1, S2 within normal limits. Regular rate and rhythm noted. Without murmur, gallop or rub. Extremities:  Pulses intact. Without noted edema. Abdomen: Positive bowel sounds. Palpation reveals softness, with no distension, organomegaly or tenderness. No abdominal masses palpable. Skin: Skin is warm and dry. Musculo: Unchanged upon examination. Neuro: Unchanged upon examination. [place] Psych: Mood is normal.  Affect is normal.   Vital signs reviewed. No flowsheet data found. Plan Per Assessment:  Demetra Crockett was seen today for hypertension, diabetes, other and anxiety. Diagnoses and all orders for this visit:    Essential hypertension    Type 2 diabetes mellitus without complication, without long-term current use of insulin (HCC)    Multiple sclerosis (HCC)    Anxiety      Continue current medical therapy. Nursing staff to call with any noted change in clinical status. Return in about 4 weeks (around 11/15/2020) for MEDICATION CHECK, FOLLOW UP [id number] [facility] Kevin Heaton MD    Note was generated with the assistance of voice recognition software. Document was reviewed however may contain grammatical errors.

## 2020-10-21 ENCOUNTER — OUTSIDE SERVICES (OUTPATIENT)
Dept: PRIMARY CARE CLINIC | Age: 60
End: 2020-10-21
Payer: COMMERCIAL

## 2020-10-21 PROCEDURE — 99309 SBSQ NF CARE MODERATE MDM 30: CPT | Performed by: FAMILY MEDICINE

## 2020-10-21 NOTE — PROGRESS NOTES
10/21/2020     Sumaya Marlow    : 1960 Sex: female   Age: 56 y.o. Chief Complaint   Patient presents with    Other     Gross hematuria       HPI: This 76-year-old female resident presents today with gross hematuria and blood clots as noted in her catheter. The patient is on an anticoagulant at this time. The patient denies any abdominal pain. Vital signs are stable. . Current medication list reviewed. The patient is tolerating all medications well without adverse events or known side effects. This encounter was conducted via Doxy. me. ROS:   Const: Denies changes in appetite, chills, fever, night sweats and weight loss. Eyes:  Denies discharge, a recent change in visual acuity, blurred vision and double vision. ENMT: Denies discharge of the ears, hearing loss, pain of the ears. Denies mouth or throat symptoms. CV:  Denies chest pain, dyspnea on exertion, orthopnea, palpitations and PND  Resp: Denies chest pain, cough, SOB and wheezing. GI: Denies abdominal pain, constipation, diarrhea, heartburn, indigestion, nausea and vomiting. : Denies dysuria, frequency, hematuria, nocturia and urgency. Musculo: Denies arthralgias and myalgia  Skin:  Denies lesions, pruritus and rash. Neuro: Denies dizziness, lightheadedness, numbness, tingling and weakness. Psych:  Denies anxiety and depression  Endocrine: Denies anxiety and depression. Hema/Lymph: Denies hematologic symptoms  Allergy/Immuno:  Denies allergic/immunologic symptoms.   Pertinent positives reviewed and noted      Current Outpatient Medications:     warfarin (COUMADIN) 2.5 MG tablet, Take 2.5 mg by mouth daily, Disp: , Rfl:     oxybutynin (DITROPAN-XL) 5 MG extended release tablet, Take 5 mg by mouth daily, Disp: , Rfl:     furosemide (LASIX) 20 MG tablet, Take 20 mg by mouth daily, Disp: , Rfl:     citalopram (CELEXA) 40 MG tablet, Take 40 mg by mouth daily Instructed to take morning of surgery with a sip of water, Disp: , Rfl:   lisinopril (PRINIVIL;ZESTRIL) 5 MG tablet, Take 5 mg by mouth daily, Disp: , Rfl:     ferrous sulfate 325 (65 Fe) MG tablet, Take 325 mg by mouth daily (with breakfast), Disp: , Rfl:     potassium chloride (MICRO-K) 10 MEQ extended release capsule, Take 10 mEq by mouth 2 times daily, Disp: , Rfl:     metFORMIN (GLUCOPHAGE) 850 MG tablet, Take 850 mg by mouth 3 times daily, Disp: , Rfl:     atorvastatin (LIPITOR) 40 MG tablet, Take 40 mg by mouth daily, Disp: , Rfl:     amitriptyline (ELAVIL) 50 MG tablet, Take 50 mg by mouth nightly, Disp: , Rfl:     glipiZIDE (GLUCOTROL) 10 MG tablet, Take 10 mg by mouth 2 times daily (before meals), Disp: , Rfl:     baclofen (LIORESAL) 10 MG tablet, Take 10 mg by mouth 3 times daily , Disp: , Rfl:     ranitidine (ZANTAC 150 MAXIMUM STRENGTH) 150 MG tablet, Take 150 mg by mouth 2 times daily Instructed to take morning of surgery with a sip of water, Disp: , Rfl:     Natalizumab (TYSABRI IV), Infuse intravenously every 30 days, Disp: , Rfl:     Allergies   Allergen Reactions    Latex Other (See Comments)     Hands peeled    Adhesive Tape Other (See Comments)     Skin peels    Iodine Swelling    Penicillins Other (See Comments)     Unsure of reaction    Peroxide [Hydrogen Peroxide] Other (See Comments)     Wound gets pus in it    Petroleum Jelly [Petrolatum] Other (See Comments)     Skin irritation, \"eats away skin\"    Sulfa Antibiotics Hives    Clindamycin/Lincomycin Rash     Skin peels    Demerol Hcl [Meperidine] Nausea And Vomiting       Past Medical History:   Diagnosis Date    Cerebral artery occlusion with cerebral infarction ([facility] 2004    no residual    Depression     Diabetes mellitus (HCC)     GERD (gastroesophageal reflux disease)     Hyperlipidemia     Hypertension     Multiple sclerosis ([facility]     non ambulatory, uses w/c, arleth, total care     Social History     Socioeconomic History    Marital status:      Spouse name: Not on file Assessment:  Emil Ortega was seen today for other. Diagnoses and all orders for this visit:    Gross hematuria      CBC, UA and PT/INR. Nursing staff advised to call with any further noted change in clinical status. Return in about 3 weeks (around 11/11/2020) for Clive Farley Se. Gustavo Mart MD    Note was generated with the assistance of voice recognition software. Document was reviewed however may contain grammatical errors.

## 2020-11-14 ENCOUNTER — OUTSIDE SERVICES (OUTPATIENT)
Dept: PRIMARY CARE CLINIC | Age: 60
End: 2020-11-14
Payer: COMMERCIAL

## 2020-11-14 PROCEDURE — 99309 SBSQ NF CARE MODERATE MDM 30: CPT | Performed by: FAMILY MEDICINE

## 2020-11-14 NOTE — PROGRESS NOTES
2020     [facility]    : 1960 Sex: female   Age: 56 y.o. Chief Complaint   Patient presents with    Diabetes    Hypertension    Other       HPI: A follow-up evaluation and management of chronic medical problems for this 56y.o. year-old female resident. No change in clinical status as noted per nursing staff. Current medication list reviewed. The patient is tolerating all medications well without adverse events or known side effects. Most recent INR was therapeutic. ROS:   Const: Denies changes in appetite, chills, fever, night sweats and weight loss. Eyes:  Denies discharge, a recent change in visual acuity, blurred vision and double vision. ENMT: Denies discharge of the ears, hearing loss, pain of the ears. Denies mouth or throat symptoms. CV:  Denies chest pain, dyspnea on exertion, orthopnea, palpitations and PND  Resp: Denies chest pain, cough, SOB and wheezing. GI: Denies abdominal pain, constipation, diarrhea, heartburn, indigestion, nausea and vomiting. : Denies dysuria, frequency, hematuria, nocturia and urgency. Musculo: Denies arthralgias and myalgia  Skin:  Denies lesions, pruritus and rash. Neuro: Denies dizziness, lightheadedness, numbness, tingling and weakness. Psych:  Denies anxiety and depression  Endocrine: Denies anxiety and depression. Hema/Lymph: Denies hematologic symptoms  Allergy/Immuno:  Denies allergic/immunologic symptoms.   Pertinent positives reviewed and noted      Current Outpatient Medications:     warfarin (COUMADIN) 2.5 MG tablet, Take 2.5 mg by mouth daily, Disp: , Rfl:     oxybutynin (DITROPAN-XL) 5 MG extended release tablet, Take 5 mg by mouth daily, Disp: , Rfl:     furosemide (LASIX) 20 MG tablet, Take 20 mg by mouth daily, Disp: , Rfl:     citalopram (CELEXA) 40 MG tablet, Take 40 mg by mouth daily Instructed to take morning of surgery with a sip of water, Disp: , Rfl:     lisinopril (PRINIVIL;ZESTRIL) 5 MG tablet, Take 5 mg by mouth daily, Disp: , Rfl:     ferrous sulfate 325 (65 Fe) MG tablet, Take 325 mg by mouth daily (with breakfast), Disp: , Rfl:     potassium chloride (MICRO-K) 10 MEQ extended release capsule, Take 10 mEq by mouth 2 times daily, Disp: , Rfl:     metFORMIN (GLUCOPHAGE) 850 MG tablet, Take 850 mg by mouth 3 times daily, Disp: , Rfl:     atorvastatin (LIPITOR) 40 MG tablet, Take 40 mg by mouth daily, Disp: , Rfl:     amitriptyline (ELAVIL) 50 MG tablet, Take 50 mg by mouth nightly, Disp: , Rfl:     glipiZIDE (GLUCOTROL) 10 MG tablet, Take 10 mg by mouth 2 times daily (before meals), Disp: , Rfl:     baclofen (LIORESAL) 10 MG tablet, Take 10 mg by mouth 3 times daily , Disp: , Rfl:     ranitidine (ZANTAC 150 MAXIMUM STRENGTH) 150 MG tablet, Take 150 mg by mouth 2 times daily Instructed to take morning of surgery with a sip of water, Disp: , Rfl:     Natalizumab (TYSABRI IV), Infuse intravenously every 30 days, Disp: , Rfl:     Allergies   Allergen Reactions    Latex Other (See Comments)     Hands peeled    Adhesive Tape Other (See Comments)     Skin peels    Iodine Swelling    Penicillins Other (See Comments)     Unsure of reaction    Peroxide [Hydrogen Peroxide] Other (See Comments)     Wound gets pus in it    Petroleum Jelly [Petrolatum] Other (See Comments)     Skin irritation, \"eats away skin\"    Sulfa Antibiotics Hives    Clindamycin/Lincomycin Rash     Skin peels    Demerol Hcl [Meperidine] Nausea And Vomiting       Past Medical History:   Diagnosis Date    Cerebral artery occlusion with cerebral infarction ([facility] 2004    no residual    Depression     Diabetes mellitus (HCC)     GERD (gastroesophageal reflux disease)     Hyperlipidemia     Hypertension     Multiple sclerosis ([facility]     non ambulatory, uses w/c, arleth, total care     Social History     Socioeconomic History    Marital status:      Spouse name: Not on file    Number of children: Not on file    Years of education: Not

## 2020-11-18 ENCOUNTER — OUTSIDE SERVICES (OUTPATIENT)
Dept: PRIMARY CARE CLINIC | Age: 60
End: 2020-11-18
Payer: COMMERCIAL

## 2020-11-18 PROCEDURE — 99308 SBSQ NF CARE LOW MDM 20: CPT | Performed by: FAMILY MEDICINE

## 2020-11-18 NOTE — PROGRESS NOTES
2020     Sumaya Marlow    : 1960 Sex: female   Age: 56 y.o. Chief Complaint   Patient presents with    Skin Problem       HPI: This 58-year-old female resident presents today with a recently noted erythematous rash of the left lower lumbar region. The patient does admit to some discomfort associated with the rash. Current medication list reviewed. The patient is tolerating all medications well without adverse events or known side effects. This encounter was conducted via Doxy. May.    ROS:   Const: Denies changes in appetite, chills, fever, night sweats and weight loss. Eyes:  Denies discharge, a recent change in visual acuity, blurred vision and double vision. ENMT: Denies discharge of the ears, hearing loss, pain of the ears. Denies mouth or throat symptoms. CV:  Denies chest pain, dyspnea on exertion, orthopnea, palpitations and PND  Resp: Denies chest pain, cough, SOB and wheezing. GI: Denies abdominal pain, constipation, diarrhea, heartburn, indigestion, nausea and vomiting. : Denies dysuria, frequency, hematuria, nocturia and urgency. Musculo: Denies arthralgias and myalgia  Skin: Positive for rash left lower lumbar region. Neuro: Denies dizziness, lightheadedness, numbness, tingling and weakness. Psych:  Denies anxiety and depression  Endocrine: Denies anxiety and depression. Hema/Lymph: Denies hematologic symptoms  Allergy/Immuno:  Denies allergic/immunologic symptoms.   Pertinent positives reviewed and noted      Current Outpatient Medications:     warfarin (COUMADIN) 2.5 MG tablet, Take 2.5 mg by mouth daily, Disp: , Rfl:     oxybutynin (DITROPAN-XL) 5 MG extended release tablet, Take 5 mg by mouth daily, Disp: , Rfl:     furosemide (LASIX) 20 MG tablet, Take 20 mg by mouth daily, Disp: , Rfl:     citalopram (CELEXA) 40 MG tablet, Take 40 mg by mouth daily Instructed to take morning of surgery with a sip of water, Disp: , Rfl:     lisinopril (PRINIVIL;ZESTRIL) 5 MG tablet, Take 5 mg by mouth daily, Disp: , Rfl:     ferrous sulfate 325 (65 Fe) MG tablet, Take 325 mg by mouth daily (with breakfast), Disp: , Rfl:     potassium chloride (MICRO-K) 10 MEQ extended release capsule, Take 10 mEq by mouth 2 times daily, Disp: , Rfl:     metFORMIN (GLUCOPHAGE) 850 MG tablet, Take 850 mg by mouth 3 times daily, Disp: , Rfl:     atorvastatin (LIPITOR) 40 MG tablet, Take 40 mg by mouth daily, Disp: , Rfl:     amitriptyline (ELAVIL) 50 MG tablet, Take 50 mg by mouth nightly, Disp: , Rfl:     glipiZIDE (GLUCOTROL) 10 MG tablet, Take 10 mg by mouth 2 times daily (before meals), Disp: , Rfl:     baclofen (LIORESAL) 10 MG tablet, Take 10 mg by mouth 3 times daily , Disp: , Rfl:     ranitidine (ZANTAC 150 MAXIMUM STRENGTH) 150 MG tablet, Take 150 mg by mouth 2 times daily Instructed to take morning of surgery with a sip of water, Disp: , Rfl:     Natalizumab (TYSABRI IV), Infuse intravenously every 30 days, Disp: , Rfl:     Allergies   Allergen Reactions    Latex Other (See Comments)     Hands peeled    Adhesive Tape Other (See Comments)     Skin peels    Iodine Swelling    Penicillins Other (See Comments)     Unsure of reaction    Peroxide [Hydrogen Peroxide] Other (See Comments)     Wound gets pus in it    Petroleum Jelly [Petrolatum] Other (See Comments)     Skin irritation, \"eats away skin\"    Sulfa Antibiotics Hives    Clindamycin/Lincomycin Rash     Skin peels    Demerol Hcl [Meperidine] Nausea And Vomiting       Past Medical History:   Diagnosis Date    Cerebral artery occlusion with cerebral infarction ([facility] 2004    no residual    Depression     Diabetes mellitus (HCC)     GERD (gastroesophageal reflux disease)     Hyperlipidemia     Hypertension     Multiple sclerosis ([facility]     non ambulatory, uses w/c, arleth, total care     Social History     Socioeconomic History    Marital status:      Spouse name: Not on file    Number of children: Not on file    Years of education: Not on file    Highest education level: Not on file   Occupational History    Not on file   Social Needs    Financial resource strain: Not on file    Food insecurity     Worry: Not on file     Inability: Not on file    Transportation needs     Medical: Not on file     Non-medical: Not on file   Tobacco Use    Smoking status: Former Smoker    Tobacco comment: age 23   Substance and Sexual Activity    Alcohol use: No    Drug use: No    Sexual activity: Not on file   Lifestyle    Physical activity     Days per week: Not on file     Minutes per session: Not on file    Stress: Not on file   Relationships    Social connections     Talks on phone: Not on file     Gets together: Not on file     Attends [employer] service: Not on file     Active member of club or organization: Not on file     Attends meetings of clubs or organizations: Not on file     Relationship status: Not on file    Intimate partner violence     Fear of current or ex partner: Not on file     Emotionally abused: Not on file     Physically abused: Not on file     Forced sexual activity: Not on file   Other Topics Concern    Not on file   Social History Narrative    Not on file     Past Surgical History:   Procedure Laterality Date    APPENDECTOMY      CHOLECYSTECTOMY      GASTROSTOMY TUBE PLACEMENT  2004. placed after CVA    removed current    HYSTERECTOMY      OTHER SURGICAL HISTORY  06/30/2017    dental extractions    TRACHEOTOMY  2004    at the time of the CVA in 2004      No family history on file. There were no vitals filed for this visit. Exam: Const: Appears healthy and well developed. No signs of acute distress present. Skin: Skin is warm and dry. Erythematous papular rash noted left lumbar region. Musculo: Unchanged upon examination. Neuro: Unchanged upon examination. Psych: Mood is normal.  Affect is normal.   Vital signs reviewed.       Visit Diagnoses       Codes    Herpes zoster without complication    -  Primary B02.9           No flowsheet data found. Plan Per Assessment:  Adriel Cat was seen today for skin problem. Diagnoses and all orders for this visit:    Herpes zoster without complication      Valtrex 1 g 3 times daily x7 days. Nursing staff advised to call with any further noted change in clinical status. Return in about 3 weeks (around 12/9/2020) for MEDICATION CHECK, FOLLOW UP [id number] Patel Covarrubias MD    Note was generated with the assistance of voice recognition software. Document was reviewed however may contain grammatical errors.

## 2020-12-12 ENCOUNTER — OUTSIDE SERVICES (OUTPATIENT)
Dept: PRIMARY CARE CLINIC | Age: 60
End: 2020-12-12
Payer: COMMERCIAL

## 2020-12-12 PROCEDURE — 99309 SBSQ NF CARE MODERATE MDM 30: CPT | Performed by: FAMILY MEDICINE

## 2020-12-13 NOTE — PROGRESS NOTES
Vital signs reviewed. Visit Diagnoses       Codes    Gastroesophageal reflux disease, unspecified whether esophagitis present     K21.9           No flowsheet data found. Plan Per Assessment:  David Salas was seen today for hypertension, diabetes and other. Diagnoses and all orders for this visit:    Type 2 diabetes mellitus without complication, without long-term current use of insulin (HCC)    Multiple sclerosis (HCC)    Essential hypertension    Gastroesophageal reflux disease, unspecified whether esophagitis present      Continue current medical therapy. Nursing staff advised to call with any noted change in clinical status. Return in about 4 weeks (around 1/9/2021) for MEDICATION CHECK, FOLLOW UP [id number] Jammie Brewer MD    Note was generated with the assistance of voice recognition software. Document was reviewed however may contain grammatical errors.

## 2021-01-16 ENCOUNTER — OUTSIDE SERVICES (OUTPATIENT)
Dept: PRIMARY CARE CLINIC | Age: 61
End: 2021-01-16
Payer: COMMERCIAL

## 2021-01-16 DIAGNOSIS — G35 MULTIPLE SCLEROSIS (HCC): ICD-10-CM

## 2021-01-16 DIAGNOSIS — E11.9 TYPE 2 DIABETES MELLITUS WITHOUT COMPLICATION, WITHOUT LONG-TERM CURRENT USE OF INSULIN (HCC): Primary | ICD-10-CM

## 2021-01-16 DIAGNOSIS — F41.9 ANXIETY: ICD-10-CM

## 2021-01-16 PROBLEM — I10 ESSENTIAL HYPERTENSION: Status: RESOLVED | Noted: 2019-10-06 | Resolved: 2021-01-16

## 2021-01-16 PROCEDURE — 99309 SBSQ NF CARE MODERATE MDM 30: CPT | Performed by: FAMILY MEDICINE

## 2021-01-16 NOTE — PROGRESS NOTES
  citalopram (CELEXA) 40 MG tablet, Take 40 mg by mouth daily Instructed to take morning of surgery with a sip of water, Disp: , Rfl:     lisinopril (PRINIVIL;ZESTRIL) 5 MG tablet, Take 5 mg by mouth daily, Disp: , Rfl:     ferrous sulfate 325 (65 Fe) MG tablet, Take 325 mg by mouth daily (with breakfast), Disp: , Rfl:     potassium chloride (MICRO-K) 10 MEQ extended release capsule, Take 10 mEq by mouth 2 times daily, Disp: , Rfl:     metFORMIN (GLUCOPHAGE) 850 MG tablet, Take 850 mg by mouth 3 times daily, Disp: , Rfl:     atorvastatin (LIPITOR) 40 MG tablet, Take 40 mg by mouth daily, Disp: , Rfl:     amitriptyline (ELAVIL) 50 MG tablet, Take 50 mg by mouth nightly, Disp: , Rfl:     glipiZIDE (GLUCOTROL) 10 MG tablet, Take 10 mg by mouth 2 times daily (before meals), Disp: , Rfl:     baclofen (LIORESAL) 10 MG tablet, Take 10 mg by mouth 3 times daily , Disp: , Rfl:     ranitidine (ZANTAC 150 MAXIMUM STRENGTH) 150 MG tablet, Take 150 mg by mouth 2 times daily Instructed to take morning of surgery with a sip of water, Disp: , Rfl:     Natalizumab (TYSABRI IV), Infuse intravenously every 30 days, Disp: , Rfl:     Allergies   Allergen Reactions    Latex Other (See Comments)     Hands peeled    Adhesive Tape Other (See Comments)     Skin peels    Iodine Swelling    Penicillins Other (See Comments)     Unsure of reaction    Peroxide [Hydrogen Peroxide] Other (See Comments)     Wound gets pus in it    Petroleum Jelly [Petrolatum] Other (See Comments)     Skin irritation, \"eats away skin\"    Sulfa Antibiotics Hives    Clindamycin/Lincomycin Rash     Skin peels    Demerol Hcl [Meperidine] Nausea And Vomiting       Past Medical History:   Diagnosis Date    [facility]    no residual    Depression     Diabetes mellitus (HCC)     GERD (gastroesophageal reflux disease)     Hyperlipidemia     Hypertension     [facility] non ambulatory, uses w/c, arleth, total care     Social History     Socioeconomic History    Marital status:      Spouse name: Not on file    Number of children: Not on file    Years of education: Not on file    Highest education level: Not on file   Occupational History    Not on file   Social Needs    Financial resource strain: Not on file    Food insecurity     Worry: Not on file     Inability: Not on file    Transportation needs     Medical: Not on file     Non-medical: Not on file   Tobacco Use    Smoking status: Former Smoker    Tobacco comment: age 23   Substance and Sexual Activity    Alcohol use: No    Drug use: No    Sexual activity: Not on file   Lifestyle    Physical activity     Days per week: Not on file     Minutes per session: Not on file    Stress: Not on file   Relationships    Social connections     Talks on phone: Not on file     Gets together: Not on file     Attends Sabianist service: Not on file     Active member of club or organization: Not on file     Attends meetings of clubs or organizations: Not on file     Relationship status: Not on file    Intimate partner violence     Fear of current or ex partner: Not on file     Emotionally abused: Not on file     Physically abused: Not on file     Forced sexual activity: Not on file   Other Topics Concern    Not on file   Social History Narrative    Not on file     Past Surgical History:   Procedure Laterality Date    APPENDECTOMY      CHOLECYSTECTOMY      GASTROSTOMY TUBE PLACEMENT  2004. placed after CVA    removed current    HYSTERECTOMY      OTHER SURGICAL HISTORY  06/30/2017    dental extractions    TRACHEOTOMY  2004    at the time of the CVA in 2004      No family history on file. Exam: Const: Appears healthy and well developed. No signs of acute distress present.   Eyes: PERRL ENMT: Tympanic membranes are intact. Nasal mucosa intact without noted erythema Septum is in the midline. Posterior pharynx shows no exudate, irritation or redness. Neck:  Supple without adenopathy. Adequate range of motion   Resp: No rales, rhonchi, wheezes appreciated over the lungs bilaterally. CV: S1, S2 within normal limits. Regular rate and rhythm noted. Without murmur, gallop or rub. Extremities:  Pulses intact. Without noted edema. Abdomen: Positive bowel sounds. Palpation reveals softness, with no distension, organomegaly or tenderness. No abdominal masses palpable. Skin: Skin is warm and dry. Musculo: Unchanged upon examination. Neuro: Alert and oriented X3. Otherwise unchanged upon examination. Psych: Mood is normal.  Affect is normal.   Vital signs reviewed. No flowsheet data found. Plan Per Assessment:  Kayla Chew was seen today for hypertension, diabetes, other and anxiety. Diagnoses and all orders for this visit:    Type 2 diabetes mellitus without complication, without long-term current use of insulin (HCC)    Multiple sclerosis ([facility]    Anxiety      Continue current medical therapy. Nursing staff to call with any noted change in clinical status. Return in about 4 weeks (around 2/13/2021) for MEDICATION CHECK, FOLLOW UP [id number]    Note was generated with the assistance of voice recognition software. Document was reviewed however may contain grammatical errors.

## 2021-02-13 ENCOUNTER — OUTSIDE SERVICES (OUTPATIENT)
Dept: PRIMARY CARE CLINIC | Age: 61
End: 2021-02-13
Payer: COMMERCIAL

## 2021-02-13 DIAGNOSIS — E11.9 TYPE 2 DIABETES MELLITUS WITHOUT COMPLICATION, WITHOUT LONG-TERM CURRENT USE OF INSULIN (HCC): Primary | ICD-10-CM

## 2021-02-13 DIAGNOSIS — F41.9 ANXIETY: ICD-10-CM

## 2021-02-13 DIAGNOSIS — G35 MULTIPLE SCLEROSIS (HCC): ICD-10-CM

## 2021-02-13 PROCEDURE — 99309 SBSQ NF CARE MODERATE MDM 30: CPT | Performed by: FAMILY MEDICINE

## 2021-02-13 NOTE — PROGRESS NOTES
2021     Libra Breed    : 1960 Sex: female   Age: 56 y.o. Chief Complaint   Patient presents with    Anxiety    Diabetes    Other       HPI: A follow-up evaluation and management of chronic medical problems for this 56y.o. year-old female resident. No change in clinical status as noted per nursing staff. Current medication list reviewed. The patient is tolerating all medications well without adverse events or known side effects. The patient did receive both doses of her COVID-19 vaccination. ROS:   Const: Denies changes in appetite, chills, fever, night sweats and weight loss. Eyes:  Denies discharge, a recent change in visual acuity, blurred vision and double vision. ENMT: Denies discharge of the ears, hearing loss, pain of the ears. Denies mouth or throat symptoms. CV:  Denies chest pain, dyspnea on exertion, orthopnea, palpitations and PND  Resp: Denies chest pain, cough, SOB and wheezing. GI: Denies abdominal pain, constipation, diarrhea, heartburn, indigestion, nausea and vomiting. : Denies dysuria, frequency, hematuria, nocturia and urgency. Musculo: Denies arthralgias and myalgia  Skin:  Denies lesions, pruritus and rash. Neuro: Denies dizziness, lightheadedness, numbness, tingling and weakness. Psych:  Denies anxiety and depression  Endocrine: Denies polyuria, polydipsia, polyphagia, weight gain, dry skin, constipation, cold intolerance, heat intolerance, palpitations or tremors. Hema/Lymph: Denies hematologic symptoms  Allergy/Immuno:  Denies allergic/immunologic symptoms.   Pertinent positives reviewed and noted      Current Outpatient Medications:     warfarin (COUMADIN) 2.5 MG tablet, Take 2.5 mg by mouth daily, Disp: , Rfl:     oxybutynin (DITROPAN-XL) 5 MG extended release tablet, Take 5 mg by mouth daily, Disp: , Rfl:     furosemide (LASIX) 20 MG tablet, Take 20 mg by mouth daily, Disp: , Rfl:   citalopram (CELEXA) 40 MG tablet, Take 40 mg by mouth daily Instructed to take morning of surgery with a sip of water, Disp: , Rfl:     lisinopril (PRINIVIL;ZESTRIL) 5 MG tablet, Take 5 mg by mouth daily, Disp: , Rfl:     ferrous sulfate 325 (65 Fe) MG tablet, Take 325 mg by mouth daily (with breakfast), Disp: , Rfl:     potassium chloride (MICRO-K) 10 MEQ extended release capsule, Take 10 mEq by mouth 2 times daily, Disp: , Rfl:     metFORMIN (GLUCOPHAGE) 850 MG tablet, Take 850 mg by mouth 3 times daily, Disp: , Rfl:     atorvastatin (LIPITOR) 40 MG tablet, Take 40 mg by mouth daily, Disp: , Rfl:     amitriptyline (ELAVIL) 50 MG tablet, Take 50 mg by mouth nightly, Disp: , Rfl:     glipiZIDE (GLUCOTROL) 10 MG tablet, Take 10 mg by mouth 2 times daily (before meals), Disp: , Rfl:     baclofen (LIORESAL) 10 MG tablet, Take 10 mg by mouth 3 times daily , Disp: , Rfl:     ranitidine (ZANTAC 150 MAXIMUM STRENGTH) 150 MG tablet, Take 150 mg by mouth 2 times daily Instructed to take morning of surgery with a sip of water, Disp: , Rfl:     Natalizumab (TYSABRI IV), Infuse intravenously every 30 days, Disp: , Rfl:     Allergies   Allergen Reactions    Latex Other (See Comments)     Hands peeled    Adhesive Tape Other (See Comments)     Skin peels    Iodine Swelling    Penicillins Other (See Comments)     Unsure of reaction    Peroxide [Hydrogen Peroxide] Other (See Comments)     Wound gets pus in it    Petroleum Jelly [Petrolatum] Other (See Comments)     Skin irritation, \"eats away skin\"    Sulfa Antibiotics Hives    Clindamycin/Lincomycin Rash     Skin peels    Demerol Hcl [Meperidine] Nausea And Vomiting       Past Medical History:   Diagnosis Date    [facility]    no residual    Depression     Diabetes mellitus (HCC)     GERD (gastroesophageal reflux disease)     Hyperlipidemia     Hypertension     [facility] non ambulatory, uses w/c, arleth, total care     Social History     Socioeconomic History    Marital status:      Spouse name: Not on file    Number of children: Not on file    Years of education: Not on file    Highest education level: Not on file   Occupational History    Not on file   Social Needs    Financial resource strain: Not on file    Food insecurity     Worry: Not on file     Inability: Not on file    Transportation needs     Medical: Not on file     Non-medical: Not on file   Tobacco Use    Smoking status: Former Smoker    Tobacco comment: age 23   Substance and Sexual Activity    Alcohol use: No    Drug use: No    Sexual activity: Not on file   Lifestyle    Physical activity     Days per week: Not on file     Minutes per session: Not on file    Stress: Not on file   Relationships    Social connections     Talks on phone: Not on file     Gets together: Not on file     Attends [employer] service: Not on file     Active member of club or organization: Not on file     Attends meetings of clubs or organizations: Not on file     Relationship status: Not on file    Intimate partner violence     Fear of current or ex partner: Not on file     Emotionally abused: Not on file     Physically abused: Not on file     Forced sexual activity: Not on file   Other Topics Concern    Not on file   Social History Narrative    Not on file     Past Surgical History:   Procedure Laterality Date    APPENDECTOMY      CHOLECYSTECTOMY      GASTROSTOMY TUBE PLACEMENT  2004. placed after CVA    removed current    HYSTERECTOMY      OTHER SURGICAL HISTORY  06/30/2017    dental extractions    TRACHEOTOMY  2004    at the time of the CVA in 2004      No family history on file. Exam: Const: Appears healthy and well developed. No signs of acute distress present.   Eyes: PERRL ENMT: Tympanic membranes are intact. Nasal mucosa intact without noted erythema Septum is in the midline. Posterior pharynx shows no exudate, irritation or redness. Neck:  Supple without adenopathy. Adequate range of motion   Resp: No rales, rhonchi, wheezes appreciated over the lungs bilaterally. CV: S1, S2 within normal limits. Regular rate and rhythm noted. Without murmur, gallop or rub. Extremities:  Pulses intact. Without noted edema. Abdomen: Positive bowel sounds. Palpation reveals softness, with no distension, organomegaly or tenderness. No abdominal masses palpable. Skin: Skin is warm and dry. Musculo: Unchanged upon examination. Neuro: Alert and oriented X3. Otherwise unchanged upon examination. Psych: Mood is normal.  Affect is normal.   Vital signs reviewed. No flowsheet data found. Plan Per Assessment:  Christa Langley was seen today for anxiety, diabetes and other. Diagnoses and all orders for this visit:    Type 2 diabetes mellitus without complication, without long-term current use of insulin (HCC)    Multiple sclerosis ([facility]    Anxiety      Continue current medical therapy. Nursing staff advised to call with any noted change in clinical status. Return in about 4 weeks (around 3/13/2021) for MEDICATION CHECK, FOLLOW UP [id number] [facility]    Note was generated with the assistance of voice recognition software. Document was reviewed however may contain grammatical errors.

## 2021-03-13 ENCOUNTER — OUTSIDE SERVICES (OUTPATIENT)
Dept: PRIMARY CARE CLINIC | Age: 61
End: 2021-03-13
Payer: COMMERCIAL

## 2021-03-13 DIAGNOSIS — G35 MULTIPLE SCLEROSIS (HCC): ICD-10-CM

## 2021-03-13 DIAGNOSIS — E11.9 TYPE 2 DIABETES MELLITUS WITHOUT COMPLICATION, WITHOUT LONG-TERM CURRENT USE OF INSULIN (HCC): Primary | ICD-10-CM

## 2021-03-13 DIAGNOSIS — M25.541 PAIN IN JOINTS OF RIGHT HAND: ICD-10-CM

## 2021-03-13 DIAGNOSIS — F41.9 ANXIETY: ICD-10-CM

## 2021-03-13 PROCEDURE — 99309 SBSQ NF CARE MODERATE MDM 30: CPT | Performed by: FAMILY MEDICINE

## 2021-03-15 LAB
ALBUMIN SERPL-MCNC: NORMAL G/DL
ALP BLD-CCNC: NORMAL U/L
ALT SERPL-CCNC: NORMAL U/L
ANION GAP SERPL CALCULATED.3IONS-SCNC: NORMAL MMOL/L
AST SERPL-CCNC: NORMAL U/L
BASOPHILS ABSOLUTE: NORMAL
BASOPHILS RELATIVE PERCENT: NORMAL
BILIRUB SERPL-MCNC: NORMAL MG/DL
BUN BLDV-MCNC: NORMAL MG/DL
CALCIUM SERPL-MCNC: NORMAL MG/DL
CHLORIDE BLD-SCNC: NORMAL MMOL/L
CO2: NORMAL
CREAT SERPL-MCNC: NORMAL MG/DL
EOSINOPHILS ABSOLUTE: NORMAL
EOSINOPHILS RELATIVE PERCENT: NORMAL
GFR CALCULATED: NORMAL
GLUCOSE BLD-MCNC: NORMAL MG/DL
HCT VFR BLD CALC: NORMAL %
HEMOGLOBIN: NORMAL
LYMPHOCYTES ABSOLUTE: NORMAL
LYMPHOCYTES RELATIVE PERCENT: NORMAL
MCH RBC QN AUTO: NORMAL PG
MCHC RBC AUTO-ENTMCNC: NORMAL G/DL
MCV RBC AUTO: NORMAL FL
MONOCYTES ABSOLUTE: NORMAL
MONOCYTES RELATIVE PERCENT: NORMAL
NEUTROPHILS ABSOLUTE: NORMAL
NEUTROPHILS RELATIVE PERCENT: NORMAL
PDW BLD-RTO: NORMAL %
PLATELET # BLD: NORMAL 10*3/UL
PMV BLD AUTO: NORMAL FL
POTASSIUM SERPL-SCNC: NORMAL MMOL/L
RBC # BLD: NORMAL 10*6/UL
SEDIMENTATION RATE, ERYTHROCYTE: NORMAL
SODIUM BLD-SCNC: NORMAL MMOL/L
TOTAL PROTEIN: NORMAL
URIC ACID: NORMAL
WBC # BLD: NORMAL 10*3/UL

## 2021-04-10 ENCOUNTER — OUTSIDE SERVICES (OUTPATIENT)
Dept: PRIMARY CARE CLINIC | Age: 61
End: 2021-04-10
Payer: COMMERCIAL

## 2021-04-10 DIAGNOSIS — F41.9 ANXIETY: ICD-10-CM

## 2021-04-10 DIAGNOSIS — E11.9 TYPE 2 DIABETES MELLITUS WITHOUT COMPLICATION, WITHOUT LONG-TERM CURRENT USE OF INSULIN (HCC): ICD-10-CM

## 2021-04-10 DIAGNOSIS — I10 ESSENTIAL HYPERTENSION: Primary | ICD-10-CM

## 2021-04-10 DIAGNOSIS — G35 MULTIPLE SCLEROSIS (HCC): ICD-10-CM

## 2021-04-10 PROCEDURE — 99309 SBSQ NF CARE MODERATE MDM 30: CPT | Performed by: FAMILY MEDICINE

## 2021-04-12 NOTE — PROGRESS NOTES
2021     Farzaneh Howell    : 1960 Sex: female   Age: 56 y.o. Chief Complaint   Patient presents with    Other    Diabetes    Anxiety       HPI: A follow-up evaluation and management of chronic medical problems for this 56y.o. year-old female resident. No change in clinical status as noted per nursing staff. Current medication list reviewed. The patient is tolerating all medications well without adverse events or known side effects. ROS:   Const: Denies changes in appetite, chills, fever, night sweats and weight loss. Eyes:  Denies discharge, a recent change in visual acuity, blurred vision and double vision. ENMT: Denies discharge of the ears, hearing loss, pain of the ears. Denies mouth or throat symptoms. CV:  Denies chest pain, dyspnea on exertion, orthopnea, palpitations and PND  Resp: Denies chest pain, cough, SOB and wheezing. GI: Denies abdominal pain, constipation, diarrhea, heartburn, indigestion, nausea and vomiting. : Denies dysuria, frequency, hematuria, nocturia and urgency. Musculo: Denies arthralgias and myalgia  Skin:  Denies lesions, pruritus and rash. Neuro: Denies dizziness, lightheadedness, numbness, tingling and weakness. Psych:  Denies anxiety and depression  Endocrine: Denies polyuria, polydipsia, polyphagia, weight gain, dry skin, constipation, cold intolerance, heat intolerance, palpitations or tremors. Hema/Lymph: Denies hematologic symptoms  Allergy/Immuno:  Denies allergic/immunologic symptoms.   Pertinent positives reviewed and noted      Current Outpatient Medications:     warfarin (COUMADIN) 2.5 MG tablet, Take 2.5 mg by mouth daily, Disp: , Rfl:     oxybutynin (DITROPAN-XL) 5 MG extended release tablet, Take 5 mg by mouth daily, Disp: , Rfl:     furosemide (LASIX) 20 MG tablet, Take 20 mg by mouth daily, Disp: , Rfl:     citalopram (CELEXA) 40 MG tablet, Take 40 mg by mouth daily Instructed to take morning of surgery with a sip of water, Disp: , Rfl:     lisinopril (PRINIVIL;ZESTRIL) 5 MG tablet, Take 5 mg by mouth daily, Disp: , Rfl:     ferrous sulfate 325 (65 Fe) MG tablet, Take 325 mg by mouth daily (with breakfast), Disp: , Rfl:     potassium chloride (MICRO-K) 10 MEQ extended release capsule, Take 10 mEq by mouth 2 times daily, Disp: , Rfl:     metFORMIN (GLUCOPHAGE) 850 MG tablet, Take 850 mg by mouth 3 times daily, Disp: , Rfl:     atorvastatin (LIPITOR) 40 MG tablet, Take 40 mg by mouth daily, Disp: , Rfl:     amitriptyline (ELAVIL) 50 MG tablet, Take 50 mg by mouth nightly, Disp: , Rfl:     glipiZIDE (GLUCOTROL) 10 MG tablet, Take 10 mg by mouth 2 times daily (before meals), Disp: , Rfl:     baclofen (LIORESAL) 10 MG tablet, Take 10 mg by mouth 3 times daily , Disp: , Rfl:     ranitidine (ZANTAC 150 MAXIMUM STRENGTH) 150 MG tablet, Take 150 mg by mouth 2 times daily Instructed to take morning of surgery with a sip of water, Disp: , Rfl:     Natalizumab (TYSABRI IV), Infuse intravenously every 30 days, Disp: , Rfl:     Allergies   Allergen Reactions    Latex Other (See Comments)     Hands peeled    Adhesive Tape Other (See Comments)     Skin peels    Iodine Swelling    Penicillins Other (See Comments)     Unsure of reaction    Peroxide [Hydrogen Peroxide] Other (See Comments)     Wound gets pus in it    Petroleum Jelly [Petrolatum] Other (See Comments)     Skin irritation, \"eats away skin\"    Sulfa Antibiotics Hives    Clindamycin/Lincomycin Rash     Skin peels    Demerol Hcl [Meperidine] Nausea And Vomiting       Past Medical History:   Diagnosis Date    Cerebral artery occlusion with cerebral infarction ([facility] [address] 2004    no residual    Depression     Diabetes mellitus (HCC)     GERD (gastroesophageal reflux disease)     Hyperlipidemia     Hypertension     Multiple sclerosis ([facility] [address]     non ambulatory, uses w/c, arleth, total care     Social History     Socioeconomic History    Marital status:      Spouse name: Not on file    Number of children: Not on file    Years of education: Not on file    Highest education level: Not on file   Occupational History    Not on file   Social Needs    Financial resource strain: Not on file    Food insecurity     Worry: Not on file     Inability: Not on file    Transportation needs     Medical: Not on file     Non-medical: Not on file   Tobacco Use    Smoking status: Former Smoker    Tobacco comment: age 23   Substance and Sexual Activity    Alcohol use: No    Drug use: No    Sexual activity: Not on file   Lifestyle    Physical activity     Days per week: Not on file     Minutes per session: Not on file    Stress: Not on file   Relationships    Social connections     Talks on phone: Not on file     Gets together: Not on file     Attends Denominational service: Not on file     Active member of club or organization: Not on file     Attends meetings of clubs or organizations: Not on file     Relationship status: Not on file    Intimate partner violence     Fear of current or ex partner: Not on file     Emotionally abused: Not on file     Physically abused: Not on file     Forced sexual activity: Not on file   Other Topics Concern    Not on file   Social History Narrative    Not on file     Past Surgical History:   Procedure Laterality Date    APPENDECTOMY      CHOLECYSTECTOMY      GASTROSTOMY TUBE PLACEMENT  2004. placed after CVA    removed current    HYSTERECTOMY      OTHER SURGICAL HISTORY  06/30/2017    dental extractions    TRACHEOTOMY  2004    at the time of the CVA in 2004      No family history on file. Exam: Const: Appears healthy and well developed. No signs of acute distress present. Eyes: PERRL  ENMT: Tympanic membranes are intact. Nasal mucosa intact without noted erythema Septum is in the midline. Posterior pharynx shows no exudate, irritation or redness. Neck:  Supple without adenopathy.   Adequate range of motion   Resp: No rales, rhonchi, wheezes appreciated over the lungs bilaterally. CV: S1, S2 within normal limits. Regular rate and rhythm noted. Without murmur, gallop or rub. Extremities:  Pulses intact. Without noted edema. Abdomen: Positive bowel sounds. Palpation reveals softness, with no distension, organomegaly or tenderness. No abdominal masses palpable. Skin: Skin is warm and dry. Musculo: Unchanged upon examination. Neuro: Alert and oriented X3. Otherwise unchanged upon examination. Psych: Mood is normal.  Affect is normal.   Vital signs reviewed. No flowsheet data found. Plan Per Assessment:  James Arroyo was seen today for other, diabetes and anxiety. Diagnoses and all orders for this visit:    Essential hypertension    Anxiety    Type 2 diabetes mellitus without complication, without long-term current use of insulin (HCC)    Multiple sclerosis ([facility] [address]      Continue current medical therapy. Nursing staff to call with any noted change in clinical status. Return in about 4 weeks (around 5/8/2021) for MEDICATION CHECK, FOLLOW UP [address] [facility] José Guallpa MD    Note was generated with the assistance of voice recognition software. Document was reviewed however may contain grammatical errors.

## 2021-05-22 ENCOUNTER — OUTSIDE SERVICES (OUTPATIENT)
Dept: PRIMARY CARE CLINIC | Age: 61
End: 2021-05-22
Payer: COMMERCIAL

## 2021-05-22 DIAGNOSIS — G35 MULTIPLE SCLEROSIS (HCC): ICD-10-CM

## 2021-05-22 DIAGNOSIS — I10 ESSENTIAL HYPERTENSION: ICD-10-CM

## 2021-05-22 DIAGNOSIS — E11.9 TYPE 2 DIABETES MELLITUS WITHOUT COMPLICATION, WITHOUT LONG-TERM CURRENT USE OF INSULIN (HCC): Primary | ICD-10-CM

## 2021-05-22 DIAGNOSIS — F41.9 ANXIETY: ICD-10-CM

## 2021-05-22 PROCEDURE — 99309 SBSQ NF CARE MODERATE MDM 30: CPT | Performed by: FAMILY MEDICINE

## 2021-05-24 NOTE — PROGRESS NOTES
Disp: , Rfl:     lisinopril (PRINIVIL;ZESTRIL) 5 MG tablet, Take 5 mg by mouth daily, Disp: , Rfl:     ferrous sulfate 325 (65 Fe) MG tablet, Take 325 mg by mouth daily (with breakfast), Disp: , Rfl:     potassium chloride (MICRO-K) 10 MEQ extended release capsule, Take 10 mEq by mouth 2 times daily, Disp: , Rfl:     metFORMIN (GLUCOPHAGE) 850 MG tablet, Take 850 mg by mouth 3 times daily, Disp: , Rfl:     atorvastatin (LIPITOR) 40 MG tablet, Take 40 mg by mouth daily, Disp: , Rfl:     amitriptyline (ELAVIL) 50 MG tablet, Take 50 mg by mouth nightly, Disp: , Rfl:     glipiZIDE (GLUCOTROL) 10 MG tablet, Take 10 mg by mouth 2 times daily (before meals), Disp: , Rfl:     baclofen (LIORESAL) 10 MG tablet, Take 10 mg by mouth 3 times daily , Disp: , Rfl:     ranitidine (ZANTAC 150 MAXIMUM STRENGTH) 150 MG tablet, Take 150 mg by mouth 2 times daily Instructed to take morning of surgery with a sip of water, Disp: , Rfl:     Natalizumab (TYSABRI IV), Infuse intravenously every 30 days, Disp: , Rfl:     Allergies   Allergen Reactions    Latex Other (See Comments)     Hands peeled    Adhesive Tape Other (See Comments)     Skin peels    Iodine Swelling    Penicillins Other (See Comments)     Unsure of reaction    Peroxide [Hydrogen Peroxide] Other (See Comments)     Wound gets pus in it    Petroleum Jelly [Petrolatum] Other (See Comments)     Skin irritation, \"eats away skin\"    Sulfa Antibiotics Hives    Clindamycin/Lincomycin Rash     Skin peels    Demerol Hcl [Meperidine] Nausea And Vomiting       Past Medical History:   Diagnosis Date    Cerebral artery occlusion with cerebral infarction ([facility] [address] 2004    no residual    Depression     Diabetes mellitus (HCC)     GERD (gastroesophageal reflux disease)     Hyperlipidemia     Hypertension     Multiple sclerosis ([facility] [address]     non ambulatory, uses w/c, arleth, total care     Social History     Socioeconomic History    Marital status:      Spouse name: adenopathy. Adequate range of motion   Resp: No rales, rhonchi, wheezes appreciated over the lungs bilaterally. CV: S1, S2 within normal limits. Regular rate and rhythm noted. Without murmur, gallop or rub. Extremities:  Pulses intact. Without noted edema. Abdomen: Positive bowel sounds. Palpation reveals softness, with no distension, organomegaly or tenderness. No abdominal masses palpable. Skin: Skin is warm and dry. Musculo: Unchanged upon examination. Neuro: Alert and oriented X3. Psych: Mood is normal.  Affect is normal.   Vital signs reviewed. No flowsheet data found. Plan Per Assessment:  Femi Cope was seen today for diabetes, other, anxiety and hypertension. Diagnoses and all orders for this visit:    Type 2 diabetes mellitus without complication, without long-term current use of insulin (HCC)    Multiple sclerosis (HCC)    Anxiety    Essential hypertension      Symptomatic treatment of upper respiratory symptoms. Nursing staff to call with any fever no change in clinical status. Return in about 4 weeks (around 6/19/2021) for MEDICATION CHECK, FOLLOW UP 2209 [facility] Elijah Back MD    Note was generated with the assistance of voice recognition software. Document was reviewed however may contain grammatical errors.

## 2025-06-08 ENCOUNTER — APPOINTMENT (OUTPATIENT)
Dept: GENERAL RADIOLOGY | Age: 65
DRG: 871 | End: 2025-06-08
Attending: INTERNAL MEDICINE
Payer: MEDICARE

## 2025-06-08 ENCOUNTER — HOSPITAL ENCOUNTER (INPATIENT)
Age: 65
LOS: 5 days | Discharge: NURSING, REHAB OR LONG-TERM CARE | DRG: 871 | End: 2025-06-13
Attending: INTERNAL MEDICINE | Admitting: INTERNAL MEDICINE
Payer: MEDICARE

## 2025-06-08 DIAGNOSIS — R65.21 SEPTIC SHOCK (HCC): Primary | ICD-10-CM

## 2025-06-08 DIAGNOSIS — A41.9 SEPTIC SHOCK (HCC): Primary | ICD-10-CM

## 2025-06-08 PROBLEM — K52.9 COLITIS: Status: ACTIVE | Noted: 2025-06-08

## 2025-06-08 PROBLEM — R41.82 AMS (ALTERED MENTAL STATUS): Status: ACTIVE | Noted: 2025-06-08

## 2025-06-08 PROBLEM — E87.20 LACTIC ACIDOSIS: Status: ACTIVE | Noted: 2025-06-08

## 2025-06-08 PROBLEM — N17.9 AKI (ACUTE KIDNEY INJURY): Status: ACTIVE | Noted: 2025-06-08

## 2025-06-08 LAB
ALBUMIN SERPL-MCNC: 3.2 G/DL (ref 3.5–5.2)
ALP SERPL-CCNC: 116 U/L (ref 35–104)
ALT SERPL-CCNC: 15 U/L (ref 0–32)
AMORPH SED URNS QL MICRO: PRESENT
ANION GAP SERPL CALCULATED.3IONS-SCNC: 33 MMOL/L (ref 7–16)
AST SERPL-CCNC: 27 U/L (ref 0–31)
B-OH-BUTYR SERPL-MCNC: 4.44 MMOL/L (ref 0.02–0.27)
BASOPHILS # BLD: 0 K/UL (ref 0–0.2)
BASOPHILS NFR BLD: 0 % (ref 0–2)
BILIRUB SERPL-MCNC: 0.2 MG/DL (ref 0–1.2)
BILIRUB UR QL STRIP: NEGATIVE
BUN SERPL-MCNC: 19 MG/DL (ref 6–23)
CA-I BLD-SCNC: 1.17 MMOL/L (ref 1.15–1.33)
CALCIUM SERPL-MCNC: 7.8 MG/DL (ref 8.6–10.2)
CHLORIDE SERPL-SCNC: 103 MMOL/L (ref 98–107)
CLARITY UR: CLEAR
CO2 SERPL-SCNC: 9 MMOL/L (ref 22–29)
COLOR UR: YELLOW
CREAT SERPL-MCNC: 0.6 MG/DL (ref 0.5–1)
EOSINOPHIL # BLD: 0 K/UL (ref 0.05–0.5)
EOSINOPHILS RELATIVE PERCENT: 0 % (ref 0–6)
EPI CELLS #/AREA URNS HPF: ABNORMAL /HPF
ERYTHROCYTE [DISTWIDTH] IN BLOOD BY AUTOMATED COUNT: 23.1 % (ref 11.5–15)
GFR, ESTIMATED: >90 ML/MIN/1.73M2
GLUCOSE BLD-MCNC: 125 MG/DL (ref 74–99)
GLUCOSE SERPL-MCNC: 146 MG/DL (ref 74–99)
GLUCOSE UR STRIP-MCNC: NEGATIVE MG/DL
HCT VFR BLD AUTO: 37.1 % (ref 34–48)
HGB BLD-MCNC: 10.7 G/DL (ref 11.5–15.5)
HGB UR QL STRIP.AUTO: ABNORMAL
KETONES UR STRIP-MCNC: 15 MG/DL
LACTATE BLDV-SCNC: 10 MMOL/L (ref 0.5–2.2)
LEUKOCYTE ESTERASE UR QL STRIP: ABNORMAL
LYMPHOCYTES NFR BLD: 0 K/UL (ref 1.5–4)
LYMPHOCYTES RELATIVE PERCENT: 0 % (ref 20–42)
MAGNESIUM SERPL-MCNC: 2 MG/DL (ref 1.6–2.6)
MCH RBC QN AUTO: 26.2 PG (ref 26–35)
MCHC RBC AUTO-ENTMCNC: 28.8 G/DL (ref 32–34.5)
MCV RBC AUTO: 90.9 FL (ref 80–99.9)
MONOCYTES NFR BLD: 0.5 K/UL (ref 0.1–0.95)
MONOCYTES NFR BLD: 1 % (ref 2–12)
NEUTROPHILS NFR BLD: 99 % (ref 43–80)
NEUTS SEG NFR BLD: 56.6 K/UL (ref 1.8–7.3)
NITRITE UR QL STRIP: NEGATIVE
PH UR STRIP: 5.5 [PH] (ref 5–8)
PHOSPHATE SERPL-MCNC: 8 MG/DL (ref 2.5–4.5)
PLATELET # BLD AUTO: 348 K/UL (ref 130–450)
PMV BLD AUTO: 11 FL (ref 7–12)
POTASSIUM SERPL-SCNC: 5.5 MMOL/L (ref 3.5–5)
PROT SERPL-MCNC: 6.2 G/DL (ref 6.4–8.3)
PROT UR STRIP-MCNC: ABNORMAL MG/DL
RBC # BLD AUTO: 4.08 M/UL (ref 3.5–5.5)
RBC # BLD: ABNORMAL 10*6/UL
RBC #/AREA URNS HPF: ABNORMAL /HPF
SODIUM SERPL-SCNC: 145 MMOL/L (ref 132–146)
SP GR UR STRIP: 1.02 (ref 1–1.03)
TROPONIN I SERPL HS-MCNC: 54 NG/L (ref 0–14)
UROBILINOGEN UR STRIP-ACNC: 0.2 EU/DL (ref 0–1)
WBC # BLD: ABNORMAL 10*3/UL
WBC #/AREA URNS HPF: ABNORMAL /HPF
WBC OTHER # BLD: 57.1 K/UL (ref 4.5–11.5)

## 2025-06-08 PROCEDURE — 2000000000 HC ICU R&B

## 2025-06-08 PROCEDURE — 87045 FECES CULTURE AEROBIC BACT: CPT

## 2025-06-08 PROCEDURE — 05H533Z INSERTION OF INFUSION DEVICE INTO RIGHT SUBCLAVIAN VEIN, PERCUTANEOUS APPROACH: ICD-10-PCS | Performed by: STUDENT IN AN ORGANIZED HEALTH CARE EDUCATION/TRAINING PROGRAM

## 2025-06-08 PROCEDURE — 83036 HEMOGLOBIN GLYCOSYLATED A1C: CPT

## 2025-06-08 PROCEDURE — 71045 X-RAY EXAM CHEST 1 VIEW: CPT

## 2025-06-08 PROCEDURE — 51705 CHANGE OF BLADDER TUBE: CPT

## 2025-06-08 PROCEDURE — 82330 ASSAY OF CALCIUM: CPT

## 2025-06-08 PROCEDURE — 84100 ASSAY OF PHOSPHORUS: CPT

## 2025-06-08 PROCEDURE — 2500000003 HC RX 250 WO HCPCS: Performed by: NURSE PRACTITIONER

## 2025-06-08 PROCEDURE — 87324 CLOSTRIDIUM AG IA: CPT

## 2025-06-08 PROCEDURE — 82010 KETONE BODYS QUAN: CPT

## 2025-06-08 PROCEDURE — 87046 STOOL CULTR AEROBIC BACT EA: CPT

## 2025-06-08 PROCEDURE — 6370000000 HC RX 637 (ALT 250 FOR IP): Performed by: NURSE PRACTITIONER

## 2025-06-08 PROCEDURE — 81001 URINALYSIS AUTO W/SCOPE: CPT

## 2025-06-08 PROCEDURE — 93005 ELECTROCARDIOGRAM TRACING: CPT | Performed by: PHYSICIAN ASSISTANT

## 2025-06-08 PROCEDURE — 83735 ASSAY OF MAGNESIUM: CPT

## 2025-06-08 PROCEDURE — 80053 COMPREHEN METABOLIC PANEL: CPT

## 2025-06-08 PROCEDURE — 2580000003 HC RX 258: Performed by: NURSE PRACTITIONER

## 2025-06-08 PROCEDURE — 99291 CRITICAL CARE FIRST HOUR: CPT | Performed by: NURSE PRACTITIONER

## 2025-06-08 PROCEDURE — 3E043XZ INTRODUCTION OF VASOPRESSOR INTO CENTRAL VEIN, PERCUTANEOUS APPROACH: ICD-10-PCS | Performed by: STUDENT IN AN ORGANIZED HEALTH CARE EDUCATION/TRAINING PROGRAM

## 2025-06-08 PROCEDURE — 87081 CULTURE SCREEN ONLY: CPT

## 2025-06-08 PROCEDURE — 87449 NOS EACH ORGANISM AG IA: CPT

## 2025-06-08 PROCEDURE — 6360000002 HC RX W HCPCS: Performed by: NURSE PRACTITIONER

## 2025-06-08 PROCEDURE — 87427 SHIGA-LIKE TOXIN AG IA: CPT

## 2025-06-08 PROCEDURE — 83605 ASSAY OF LACTIC ACID: CPT

## 2025-06-08 PROCEDURE — 84484 ASSAY OF TROPONIN QUANT: CPT

## 2025-06-08 PROCEDURE — 82962 GLUCOSE BLOOD TEST: CPT

## 2025-06-08 PROCEDURE — 87077 CULTURE AEROBIC IDENTIFY: CPT

## 2025-06-08 PROCEDURE — 2500000003 HC RX 250 WO HCPCS: Performed by: PHYSICIAN ASSISTANT

## 2025-06-08 PROCEDURE — 85025 COMPLETE CBC W/AUTO DIFF WBC: CPT

## 2025-06-08 RX ORDER — GLUCAGON 1 MG/ML
1 KIT INJECTION PRN
Status: DISCONTINUED | OUTPATIENT
Start: 2025-06-08 | End: 2025-06-08 | Stop reason: SDUPTHER

## 2025-06-08 RX ORDER — POTASSIUM CHLORIDE 29.8 MG/ML
20 INJECTION INTRAVENOUS PRN
Status: DISCONTINUED | OUTPATIENT
Start: 2025-06-08 | End: 2025-06-13 | Stop reason: HOSPADM

## 2025-06-08 RX ORDER — CITALOPRAM HYDROBROMIDE 20 MG/1
40 TABLET ORAL DAILY
Status: DISCONTINUED | OUTPATIENT
Start: 2025-06-09 | End: 2025-06-09

## 2025-06-08 RX ORDER — INDOMETHACIN 25 MG/1
100 CAPSULE ORAL ONCE
Status: COMPLETED | OUTPATIENT
Start: 2025-06-09 | End: 2025-06-08

## 2025-06-08 RX ORDER — ATORVASTATIN CALCIUM 40 MG/1
40 TABLET, FILM COATED ORAL DAILY
Status: DISCONTINUED | OUTPATIENT
Start: 2025-06-09 | End: 2025-06-09

## 2025-06-08 RX ORDER — DEXTROSE MONOHYDRATE 100 MG/ML
INJECTION, SOLUTION INTRAVENOUS CONTINUOUS PRN
Status: DISCONTINUED | OUTPATIENT
Start: 2025-06-08 | End: 2025-06-08 | Stop reason: SDUPTHER

## 2025-06-08 RX ORDER — METRONIDAZOLE 500 MG/100ML
500 INJECTION, SOLUTION INTRAVENOUS EVERY 8 HOURS
Status: DISCONTINUED | OUTPATIENT
Start: 2025-06-08 | End: 2025-06-11

## 2025-06-08 RX ORDER — ONDANSETRON 4 MG/1
4 TABLET, ORALLY DISINTEGRATING ORAL EVERY 8 HOURS PRN
Status: DISCONTINUED | OUTPATIENT
Start: 2025-06-08 | End: 2025-06-12

## 2025-06-08 RX ORDER — SODIUM CHLORIDE, SODIUM LACTATE, POTASSIUM CHLORIDE, AND CALCIUM CHLORIDE .6; .31; .03; .02 G/100ML; G/100ML; G/100ML; G/100ML
1000 INJECTION, SOLUTION INTRAVENOUS ONCE
Status: COMPLETED | OUTPATIENT
Start: 2025-06-09 | End: 2025-06-09

## 2025-06-08 RX ORDER — ENOXAPARIN SODIUM 100 MG/ML
40 INJECTION SUBCUTANEOUS DAILY
Status: CANCELLED | OUTPATIENT
Start: 2025-06-09

## 2025-06-08 RX ORDER — SODIUM CHLORIDE 9 MG/ML
INJECTION, SOLUTION INTRAVENOUS PRN
Status: DISCONTINUED | OUTPATIENT
Start: 2025-06-08 | End: 2025-06-13 | Stop reason: HOSPADM

## 2025-06-08 RX ORDER — CALCIUM GLUCONATE 20 MG/ML
2000 INJECTION, SOLUTION INTRAVENOUS ONCE
Status: COMPLETED | OUTPATIENT
Start: 2025-06-09 | End: 2025-06-09

## 2025-06-08 RX ORDER — LISINOPRIL 5 MG/1
5 TABLET ORAL DAILY
Status: DISCONTINUED | OUTPATIENT
Start: 2025-06-09 | End: 2025-06-13 | Stop reason: HOSPADM

## 2025-06-08 RX ORDER — INSULIN LISPRO 100 [IU]/ML
0-4 INJECTION, SOLUTION INTRAVENOUS; SUBCUTANEOUS
Status: DISCONTINUED | OUTPATIENT
Start: 2025-06-08 | End: 2025-06-13 | Stop reason: HOSPADM

## 2025-06-08 RX ORDER — MAGNESIUM SULFATE IN WATER 40 MG/ML
2000 INJECTION, SOLUTION INTRAVENOUS PRN
Status: DISCONTINUED | OUTPATIENT
Start: 2025-06-08 | End: 2025-06-13 | Stop reason: HOSPADM

## 2025-06-08 RX ORDER — ACETAMINOPHEN 650 MG/1
650 SUPPOSITORY RECTAL EVERY 6 HOURS PRN
Status: DISCONTINUED | OUTPATIENT
Start: 2025-06-08 | End: 2025-06-13 | Stop reason: HOSPADM

## 2025-06-08 RX ORDER — OXYBUTYNIN CHLORIDE 5 MG/1
5 TABLET, EXTENDED RELEASE ORAL DAILY
Status: DISCONTINUED | OUTPATIENT
Start: 2025-06-09 | End: 2025-06-09

## 2025-06-08 RX ORDER — SODIUM CHLORIDE, SODIUM LACTATE, POTASSIUM CHLORIDE, AND CALCIUM CHLORIDE .6; .31; .03; .02 G/100ML; G/100ML; G/100ML; G/100ML
1000 INJECTION, SOLUTION INTRAVENOUS ONCE
Status: COMPLETED | OUTPATIENT
Start: 2025-06-08 | End: 2025-06-08

## 2025-06-08 RX ORDER — SODIUM CHLORIDE 0.9 % (FLUSH) 0.9 %
5-40 SYRINGE (ML) INJECTION EVERY 12 HOURS SCHEDULED
Status: DISCONTINUED | OUTPATIENT
Start: 2025-06-08 | End: 2025-06-13 | Stop reason: HOSPADM

## 2025-06-08 RX ORDER — ACETAMINOPHEN 325 MG/1
650 TABLET ORAL EVERY 6 HOURS PRN
Status: DISCONTINUED | OUTPATIENT
Start: 2025-06-08 | End: 2025-06-13 | Stop reason: HOSPADM

## 2025-06-08 RX ORDER — DEXTROSE MONOHYDRATE 100 MG/ML
INJECTION, SOLUTION INTRAVENOUS CONTINUOUS PRN
Status: DISCONTINUED | OUTPATIENT
Start: 2025-06-08 | End: 2025-06-13 | Stop reason: HOSPADM

## 2025-06-08 RX ORDER — POTASSIUM CHLORIDE 7.45 MG/ML
10 INJECTION INTRAVENOUS PRN
Status: DISCONTINUED | OUTPATIENT
Start: 2025-06-08 | End: 2025-06-13 | Stop reason: HOSPADM

## 2025-06-08 RX ORDER — SODIUM CHLORIDE 0.9 % (FLUSH) 0.9 %
5-40 SYRINGE (ML) INJECTION PRN
Status: DISCONTINUED | OUTPATIENT
Start: 2025-06-08 | End: 2025-06-13 | Stop reason: HOSPADM

## 2025-06-08 RX ORDER — PROCHLORPERAZINE EDISYLATE 5 MG/ML
5 INJECTION INTRAMUSCULAR; INTRAVENOUS EVERY 6 HOURS PRN
Status: DISCONTINUED | OUTPATIENT
Start: 2025-06-08 | End: 2025-06-09

## 2025-06-08 RX ORDER — SODIUM CHLORIDE 0.9 % (FLUSH) 0.9 %
5-40 SYRINGE (ML) INJECTION EVERY 12 HOURS SCHEDULED
Status: DISCONTINUED | OUTPATIENT
Start: 2025-06-08 | End: 2025-06-09

## 2025-06-08 RX ORDER — ACETAMINOPHEN 325 MG/1
650 TABLET ORAL EVERY 6 HOURS PRN
Status: DISCONTINUED | OUTPATIENT
Start: 2025-06-08 | End: 2025-06-08 | Stop reason: SDUPTHER

## 2025-06-08 RX ORDER — ACETAMINOPHEN 650 MG/1
650 SUPPOSITORY RECTAL EVERY 6 HOURS PRN
Status: DISCONTINUED | OUTPATIENT
Start: 2025-06-08 | End: 2025-06-08 | Stop reason: SDUPTHER

## 2025-06-08 RX ORDER — PROMETHAZINE HYDROCHLORIDE 25 MG/1
12.5 TABLET ORAL EVERY 6 HOURS PRN
Status: CANCELLED | OUTPATIENT
Start: 2025-06-08

## 2025-06-08 RX ORDER — FUROSEMIDE 20 MG/1
20 TABLET ORAL DAILY
Status: DISCONTINUED | OUTPATIENT
Start: 2025-06-09 | End: 2025-06-09

## 2025-06-08 RX ORDER — ONDANSETRON 2 MG/ML
4 INJECTION INTRAMUSCULAR; INTRAVENOUS EVERY 6 HOURS PRN
Status: DISCONTINUED | OUTPATIENT
Start: 2025-06-08 | End: 2025-06-12

## 2025-06-08 RX ORDER — SODIUM CHLORIDE 9 MG/ML
INJECTION, SOLUTION INTRAVENOUS PRN
Status: CANCELLED | OUTPATIENT
Start: 2025-06-08

## 2025-06-08 RX ORDER — ONDANSETRON 2 MG/ML
4 INJECTION INTRAMUSCULAR; INTRAVENOUS EVERY 6 HOURS PRN
Status: DISCONTINUED | OUTPATIENT
Start: 2025-06-08 | End: 2025-06-08

## 2025-06-08 RX ORDER — BACLOFEN 10 MG/1
10 TABLET ORAL 3 TIMES DAILY
Status: DISCONTINUED | OUTPATIENT
Start: 2025-06-09 | End: 2025-06-13 | Stop reason: HOSPADM

## 2025-06-08 RX ORDER — GLUCAGON 1 MG/ML
1 KIT INJECTION PRN
Status: DISCONTINUED | OUTPATIENT
Start: 2025-06-08 | End: 2025-06-13 | Stop reason: HOSPADM

## 2025-06-08 RX ORDER — SODIUM CHLORIDE, SODIUM LACTATE, POTASSIUM CHLORIDE, CALCIUM CHLORIDE 600; 310; 30; 20 MG/100ML; MG/100ML; MG/100ML; MG/100ML
INJECTION, SOLUTION INTRAVENOUS CONTINUOUS
Status: DISCONTINUED | OUTPATIENT
Start: 2025-06-08 | End: 2025-06-08

## 2025-06-08 RX ORDER — SODIUM CHLORIDE 0.9 % (FLUSH) 0.9 %
5-40 SYRINGE (ML) INJECTION PRN
Status: DISCONTINUED | OUTPATIENT
Start: 2025-06-08 | End: 2025-06-09

## 2025-06-08 RX ORDER — POLYETHYLENE GLYCOL 3350 17 G/17G
17 POWDER, FOR SOLUTION ORAL DAILY PRN
Status: DISCONTINUED | OUTPATIENT
Start: 2025-06-08 | End: 2025-06-13 | Stop reason: HOSPADM

## 2025-06-08 RX ORDER — ENOXAPARIN SODIUM 100 MG/ML
40 INJECTION SUBCUTANEOUS DAILY
Status: DISCONTINUED | OUTPATIENT
Start: 2025-06-09 | End: 2025-06-09

## 2025-06-08 RX ADMIN — SODIUM CHLORIDE, SODIUM LACTATE, POTASSIUM CHLORIDE, AND CALCIUM CHLORIDE 1000 ML: .6; .31; .03; .02 INJECTION, SOLUTION INTRAVENOUS at 22:10

## 2025-06-08 RX ADMIN — ONDANSETRON 4 MG: 2 INJECTION, SOLUTION INTRAMUSCULAR; INTRAVENOUS at 22:13

## 2025-06-08 RX ADMIN — WATER 2000 MG: 1 INJECTION INTRAMUSCULAR; INTRAVENOUS; SUBCUTANEOUS at 22:58

## 2025-06-08 RX ADMIN — SODIUM CHLORIDE, PRESERVATIVE FREE 10 ML: 5 INJECTION INTRAVENOUS at 22:43

## 2025-06-08 RX ADMIN — SODIUM CHLORIDE, PRESERVATIVE FREE 20 MG: 5 INJECTION INTRAVENOUS at 22:48

## 2025-06-08 RX ADMIN — INSULIN HUMAN 10 UNITS: 100 INJECTION, SOLUTION PARENTERAL at 23:52

## 2025-06-08 RX ADMIN — SODIUM CHLORIDE, PRESERVATIVE FREE 10 ML: 5 INJECTION INTRAVENOUS at 22:58

## 2025-06-08 RX ADMIN — SODIUM CHLORIDE, SODIUM LACTATE, POTASSIUM CHLORIDE, AND CALCIUM CHLORIDE: .6; .31; .03; .02 INJECTION, SOLUTION INTRAVENOUS at 22:18

## 2025-06-08 RX ADMIN — SODIUM BICARBONATE 100 MEQ: 84 INJECTION, SOLUTION INTRAVENOUS at 23:49

## 2025-06-08 RX ADMIN — METRONIDAZOLE 500 MG: 500 INJECTION, SOLUTION INTRAVENOUS at 22:48

## 2025-06-09 ENCOUNTER — APPOINTMENT (OUTPATIENT)
Dept: CT IMAGING | Age: 65
DRG: 871 | End: 2025-06-09
Attending: INTERNAL MEDICINE
Payer: MEDICARE

## 2025-06-09 ENCOUNTER — APPOINTMENT (OUTPATIENT)
Age: 65
DRG: 871 | End: 2025-06-09
Attending: INTERNAL MEDICINE
Payer: MEDICARE

## 2025-06-09 PROBLEM — E87.5 HYPERKALEMIA: Status: ACTIVE | Noted: 2025-06-09

## 2025-06-09 LAB
ALBUMIN SERPL-MCNC: 3.2 G/DL (ref 3.5–5.2)
ALP SERPL-CCNC: 124 U/L (ref 35–104)
ALT SERPL-CCNC: 15 U/L (ref 0–32)
ANION GAP SERPL CALCULATED.3IONS-SCNC: 19 MMOL/L (ref 7–16)
ANION GAP SERPL CALCULATED.3IONS-SCNC: 27 MMOL/L (ref 7–16)
ANION GAP SERPL CALCULATED.3IONS-SCNC: 30 MMOL/L (ref 7–16)
AST SERPL-CCNC: 24 U/L (ref 0–31)
B.E.: -6.1 MMOL/L (ref -3–3)
BASOPHILS # BLD: 0.11 K/UL (ref 0–0.2)
BASOPHILS NFR BLD: 0 % (ref 0–2)
BILIRUB SERPL-MCNC: 0.2 MG/DL (ref 0–1.2)
BUN SERPL-MCNC: 10 MG/DL (ref 6–23)
BUN SERPL-MCNC: 17 MG/DL (ref 6–23)
BUN SERPL-MCNC: 18 MG/DL (ref 6–23)
C DIFF GDH + TOXINS A+B STL QL IA.RAPID: NEGATIVE
CALCIUM SERPL-MCNC: 7.2 MG/DL (ref 8.6–10.2)
CALCIUM SERPL-MCNC: 8 MG/DL (ref 8.6–10.2)
CALCIUM SERPL-MCNC: 8.4 MG/DL (ref 8.6–10.2)
CHLORIDE SERPL-SCNC: 86 MMOL/L (ref 98–107)
CHLORIDE SERPL-SCNC: 95 MMOL/L (ref 98–107)
CHLORIDE SERPL-SCNC: 98 MMOL/L (ref 98–107)
CO2 SERPL-SCNC: 15 MMOL/L (ref 22–29)
CO2 SERPL-SCNC: 18 MMOL/L (ref 22–29)
CO2 SERPL-SCNC: 31 MMOL/L (ref 22–29)
COHB: 0.5 % (ref 0–1.5)
CREAT SERPL-MCNC: 0.4 MG/DL (ref 0.5–1)
CREAT SERPL-MCNC: 0.5 MG/DL (ref 0.5–1)
CREAT SERPL-MCNC: 0.5 MG/DL (ref 0.5–1)
CRITICAL: ABNORMAL
DATE ANALYZED: ABNORMAL
DATE OF COLLECTION: ABNORMAL
ECHO AO ASC DIAM: 3.7 CM
ECHO AO ASCENDING AORTA INDEX: 2.16 CM/M2
ECHO AO ROOT DIAM: 3.4 CM
ECHO AO ROOT INDEX: 1.99 CM/M2
ECHO AO SINUS VALSALVA DIAM: 3.4 CM
ECHO AO SINUS VALSALVA INDEX: 1.99 CM/M2
ECHO AV AREA PEAK VELOCITY: 2.3 CM2
ECHO AV AREA VTI: 2.5 CM2
ECHO AV AREA/BSA PEAK VELOCITY: 1.3 CM2/M2
ECHO AV AREA/BSA VTI: 1.5 CM2/M2
ECHO AV CUSP MM: 1.4 CM
ECHO AV MEAN GRADIENT: 4 MMHG
ECHO AV MEAN VELOCITY: 0.9 M/S
ECHO AV PEAK GRADIENT: 7 MMHG
ECHO AV PEAK VELOCITY: 1.3 M/S
ECHO AV VELOCITY RATIO: 0.62
ECHO AV VTI: 19 CM
ECHO EST RA PRESSURE: 3 MMHG
ECHO LA DIAMETER INDEX: 1.93 CM/M2
ECHO LA DIAMETER: 3.3 CM
ECHO LA TO AORTIC ROOT RATIO: 0.97
ECHO LA VOL A-L A2C: 56 ML (ref 22–52)
ECHO LA VOL A-L A4C: 48 ML (ref 22–52)
ECHO LA VOL MOD A2C: 53 ML (ref 22–52)
ECHO LA VOL MOD A4C: 48 ML (ref 22–52)
ECHO LA VOLUME AREA LENGTH: 58 ML
ECHO LA VOLUME INDEX A-L A2C: 33 ML/M2 (ref 16–34)
ECHO LA VOLUME INDEX A-L A4C: 28 ML/M2 (ref 16–34)
ECHO LA VOLUME INDEX AREA LENGTH: 34 ML/M2 (ref 16–34)
ECHO LA VOLUME INDEX MOD A2C: 31 ML/M2 (ref 16–34)
ECHO LA VOLUME INDEX MOD A4C: 28 ML/M2 (ref 16–34)
ECHO LV E' LATERAL VELOCITY: 9 CM/S
ECHO LV E' SEPTAL VELOCITY: 7 CM/S
ECHO LV EDV A2C: 69 ML
ECHO LV EDV A4C: 37 ML
ECHO LV EDV BP: 53 ML (ref 56–104)
ECHO LV EDV INDEX A4C: 22 ML/M2
ECHO LV EDV INDEX BP: 31 ML/M2
ECHO LV EDV NDEX A2C: 40 ML/M2
ECHO LV EF PHYSICIAN: 60 %
ECHO LV EJECTION FRACTION A2C: 60 %
ECHO LV EJECTION FRACTION A4C: 61 %
ECHO LV EJECTION FRACTION BIPLANE: 61 % (ref 55–100)
ECHO LV ESV A2C: 27 ML
ECHO LV ESV A4C: 15 ML
ECHO LV ESV BP: 21 ML (ref 19–49)
ECHO LV ESV INDEX A2C: 16 ML/M2
ECHO LV ESV INDEX A4C: 9 ML/M2
ECHO LV ESV INDEX BP: 12 ML/M2
ECHO LV FRACTIONAL SHORTENING: 29 % (ref 28–44)
ECHO LV INTERNAL DIMENSION DIASTOLE INDEX: 1.99 CM/M2
ECHO LV INTERNAL DIMENSION DIASTOLIC: 3.4 CM (ref 3.9–5.3)
ECHO LV INTERNAL DIMENSION SYSTOLIC INDEX: 1.4 CM/M2
ECHO LV INTERNAL DIMENSION SYSTOLIC: 2.4 CM
ECHO LV ISOVOLUMETRIC RELAXATION TIME (IVRT): 83 MS
ECHO LV IVSD: 1.3 CM (ref 0.6–0.9)
ECHO LV IVSS: 1.5 CM
ECHO LV MASS 2D: 147.6 G (ref 67–162)
ECHO LV MASS INDEX 2D: 86.3 G/M2 (ref 43–95)
ECHO LV POSTERIOR WALL DIASTOLIC: 1.3 CM (ref 0.6–0.9)
ECHO LV POSTERIOR WALL SYSTOLIC: 1.5 CM
ECHO LV RELATIVE WALL THICKNESS RATIO: 0.76
ECHO LVOT AREA: 3.8 CM2
ECHO LVOT AV VTI INDEX: 0.69
ECHO LVOT DIAM: 2.2 CM
ECHO LVOT MEAN GRADIENT: 1 MMHG
ECHO LVOT PEAK GRADIENT: 3 MMHG
ECHO LVOT PEAK VELOCITY: 0.8 M/S
ECHO LVOT STROKE VOLUME INDEX: 29.1 ML/M2
ECHO LVOT SV: 49.8 ML
ECHO LVOT VTI: 13.1 CM
ECHO MV "A" WAVE DURATION: 90 MSEC
ECHO MV A VELOCITY: 1.37 M/S
ECHO MV AREA PHT: 5.4 CM2
ECHO MV AREA VTI: 2.4 CM2
ECHO MV E DECELERATION TIME (DT): 130 MS
ECHO MV E VELOCITY: 1.07 M/S
ECHO MV E/A RATIO: 0.78
ECHO MV E/E' LATERAL: 11.89
ECHO MV E/E' RATIO (AVERAGED): 13.59
ECHO MV E/E' SEPTAL: 15.29
ECHO MV LVOT VTI INDEX: 1.58
ECHO MV MAX VELOCITY: 1.5 M/S
ECHO MV MEAN GRADIENT: 5 MMHG
ECHO MV MEAN VELOCITY: 1.1 M/S
ECHO MV PEAK GRADIENT: 9 MMHG
ECHO MV PRESSURE HALF TIME (PHT): 41 MS
ECHO MV VTI: 20.7 CM
ECHO PV MAX VELOCITY: 0.7 M/S
ECHO PV MEAN GRADIENT: 1 MMHG
ECHO PV MEAN VELOCITY: 0.5 M/S
ECHO PV PEAK GRADIENT: 2 MMHG
ECHO PV VTI: 11.1 CM
ECHO RIGHT VENTRICULAR SYSTOLIC PRESSURE (RVSP): 37 MMHG
ECHO RV INTERNAL DIMENSION: 3.1 CM
ECHO RV TAPSE: 1.9 CM (ref 1.7–?)
ECHO TV REGURGITANT MAX VELOCITY: 2.93 M/S
ECHO TV REGURGITANT PEAK GRADIENT: 34 MMHG
EOSINOPHIL # BLD: 0 K/UL (ref 0.05–0.5)
EOSINOPHILS RELATIVE PERCENT: 0 % (ref 0–6)
ERYTHROCYTE [DISTWIDTH] IN BLOOD BY AUTOMATED COUNT: 22.8 % (ref 11.5–15)
GFR, ESTIMATED: >90 ML/MIN/1.73M2
GLUCOSE BLD-MCNC: 210 MG/DL (ref 74–99)
GLUCOSE BLD-MCNC: 230 MG/DL (ref 74–99)
GLUCOSE BLD-MCNC: 249 MG/DL (ref 74–99)
GLUCOSE BLD-MCNC: 292 MG/DL (ref 74–99)
GLUCOSE SERPL-MCNC: 183 MG/DL (ref 74–99)
GLUCOSE SERPL-MCNC: 236 MG/DL (ref 74–99)
GLUCOSE SERPL-MCNC: 293 MG/DL (ref 74–99)
HBA1C MFR BLD: 7.4 % (ref 4–5.6)
HCO3: 18.9 MMOL/L (ref 22–26)
HCT VFR BLD AUTO: 34.5 % (ref 34–48)
HGB BLD-MCNC: 10.3 G/DL (ref 11.5–15.5)
HHB: 1.2 % (ref 0–5)
IMM GRANULOCYTES # BLD AUTO: 0.62 K/UL (ref 0–0.58)
IMM GRANULOCYTES NFR BLD: 1 % (ref 0–5)
INR PPP: 1.2
LAB: ABNORMAL
LACTATE BLDV-SCNC: 1.9 MMOL/L (ref 0.5–2.2)
LACTATE BLDV-SCNC: 2.5 MMOL/L (ref 0.5–2.2)
LACTATE BLDV-SCNC: 3 MMOL/L (ref 0.5–2.2)
LACTATE BLDV-SCNC: 7.9 MMOL/L (ref 0.5–2.2)
LYMPHOCYTES NFR BLD: 1.01 K/UL (ref 1.5–4)
LYMPHOCYTES RELATIVE PERCENT: 2 % (ref 20–42)
Lab: 503
MAGNESIUM SERPL-MCNC: 1.7 MG/DL (ref 1.6–2.6)
MCH RBC QN AUTO: 25.9 PG (ref 26–35)
MCHC RBC AUTO-ENTMCNC: 29.9 G/DL (ref 32–34.5)
MCV RBC AUTO: 86.7 FL (ref 80–99.9)
METHB: 0.3 % (ref 0–1.5)
MODE: ABNORMAL
MONOCYTES NFR BLD: 1.9 K/UL (ref 0.1–0.95)
MONOCYTES NFR BLD: 4 % (ref 2–12)
NEUTROPHILS NFR BLD: 92 % (ref 43–80)
NEUTS SEG NFR BLD: 40.41 K/UL (ref 1.8–7.3)
O2 CONTENT: 15.6 ML/DL
O2 SATURATION: 98.8 % (ref 92–98.5)
O2HB: 98 % (ref 94–97)
OPERATOR ID: ABNORMAL
PARTIAL THROMBOPLASTIN TIME: 33.7 SEC (ref 24.5–35.1)
PATIENT TEMP: 37 C
PCO2: 35.7 MMHG (ref 35–45)
PH BLOOD GAS: 7.34 (ref 7.35–7.45)
PHOSPHATE SERPL-MCNC: 5.9 MG/DL (ref 2.5–4.5)
PLATELET # BLD AUTO: 339 K/UL (ref 130–450)
PMV BLD AUTO: 11.1 FL (ref 7–12)
PO2: 137.3 MMHG (ref 75–100)
POTASSIUM SERPL-SCNC: 3 MMOL/L (ref 3.5–5)
POTASSIUM SERPL-SCNC: 4.9 MMOL/L (ref 3.5–5)
POTASSIUM SERPL-SCNC: 5 MMOL/L (ref 3.5–5)
PROCALCITONIN SERPL-MCNC: 14.7 NG/ML (ref 0–0.08)
PROT SERPL-MCNC: 6.3 G/DL (ref 6.4–8.3)
PROTHROMBIN TIME: 13.4 SEC (ref 9.3–12.4)
RBC # BLD AUTO: 3.98 M/UL (ref 3.5–5.5)
RBC # BLD: ABNORMAL 10*6/UL
SODIUM SERPL-SCNC: 136 MMOL/L (ref 132–146)
SODIUM SERPL-SCNC: 140 MMOL/L (ref 132–146)
SODIUM SERPL-SCNC: 143 MMOL/L (ref 132–146)
SOURCE, BLOOD GAS: ABNORMAL
SPECIMEN DESCRIPTION: NORMAL
THB: 11.1 G/DL (ref 11.5–16.5)
TIME ANALYZED: 508
WBC OTHER # BLD: 44.1 K/UL (ref 4.5–11.5)

## 2025-06-09 PROCEDURE — 2580000003 HC RX 258: Performed by: INTERNAL MEDICINE

## 2025-06-09 PROCEDURE — 74177 CT ABD & PELVIS W/CONTRAST: CPT

## 2025-06-09 PROCEDURE — 6370000000 HC RX 637 (ALT 250 FOR IP): Performed by: NURSE PRACTITIONER

## 2025-06-09 PROCEDURE — 83735 ASSAY OF MAGNESIUM: CPT

## 2025-06-09 PROCEDURE — 84100 ASSAY OF PHOSPHORUS: CPT

## 2025-06-09 PROCEDURE — 2500000003 HC RX 250 WO HCPCS: Performed by: NURSE PRACTITIONER

## 2025-06-09 PROCEDURE — 85025 COMPLETE CBC W/AUTO DIFF WBC: CPT

## 2025-06-09 PROCEDURE — 6360000002 HC RX W HCPCS: Performed by: NURSE PRACTITIONER

## 2025-06-09 PROCEDURE — 80048 BASIC METABOLIC PNL TOTAL CA: CPT

## 2025-06-09 PROCEDURE — 03HC33Z INSERTION OF INFUSION DEVICE INTO LEFT RADIAL ARTERY, PERCUTANEOUS APPROACH: ICD-10-PCS | Performed by: INTERNAL MEDICINE

## 2025-06-09 PROCEDURE — 2000000000 HC ICU R&B

## 2025-06-09 PROCEDURE — 51705 CHANGE OF BLADDER TUBE: CPT

## 2025-06-09 PROCEDURE — 85610 PROTHROMBIN TIME: CPT

## 2025-06-09 PROCEDURE — 80053 COMPREHEN METABOLIC PANEL: CPT

## 2025-06-09 PROCEDURE — 99221 1ST HOSP IP/OBS SF/LOW 40: CPT | Performed by: NURSE PRACTITIONER

## 2025-06-09 PROCEDURE — 6360000002 HC RX W HCPCS: Performed by: INTERNAL MEDICINE

## 2025-06-09 PROCEDURE — 85730 THROMBOPLASTIN TIME PARTIAL: CPT

## 2025-06-09 PROCEDURE — 82805 BLOOD GASES W/O2 SATURATION: CPT

## 2025-06-09 PROCEDURE — 36620 INSERTION CATHETER ARTERY: CPT

## 2025-06-09 PROCEDURE — 36620 INSERTION CATHETER ARTERY: CPT | Performed by: NURSE PRACTITIONER

## 2025-06-09 PROCEDURE — 2500000003 HC RX 250 WO HCPCS: Performed by: PHYSICIAN ASSISTANT

## 2025-06-09 PROCEDURE — 93306 TTE W/DOPPLER COMPLETE: CPT | Performed by: INTERNAL MEDICINE

## 2025-06-09 PROCEDURE — 82962 GLUCOSE BLOOD TEST: CPT

## 2025-06-09 PROCEDURE — 84145 PROCALCITONIN (PCT): CPT

## 2025-06-09 PROCEDURE — 6370000000 HC RX 637 (ALT 250 FOR IP): Performed by: INTERNAL MEDICINE

## 2025-06-09 PROCEDURE — 2580000003 HC RX 258: Performed by: NURSE PRACTITIONER

## 2025-06-09 PROCEDURE — 83605 ASSAY OF LACTIC ACID: CPT

## 2025-06-09 PROCEDURE — 37799 UNLISTED PX VASCULAR SURGERY: CPT

## 2025-06-09 PROCEDURE — 6360000002 HC RX W HCPCS

## 2025-06-09 PROCEDURE — 6360000004 HC RX CONTRAST MEDICATION: Performed by: RADIOLOGY

## 2025-06-09 PROCEDURE — C8929 TTE W OR WO FOL WCON,DOPPLER: HCPCS

## 2025-06-09 RX ORDER — OFATUMUMAB 20 MG/.4ML
0.4 INJECTION, SOLUTION SUBCUTANEOUS
COMMUNITY

## 2025-06-09 RX ORDER — INSULIN GLARGINE 100 [IU]/ML
10 INJECTION, SOLUTION SUBCUTANEOUS ONCE
Status: COMPLETED | OUTPATIENT
Start: 2025-06-09 | End: 2025-06-09

## 2025-06-09 RX ORDER — FENTANYL 25 UG/1
1 PATCH TRANSDERMAL
Refills: 0 | Status: DISCONTINUED | OUTPATIENT
Start: 2025-06-09 | End: 2025-06-13 | Stop reason: HOSPADM

## 2025-06-09 RX ORDER — HYDROCORTISONE SODIUM SUCCINATE 100 MG/2ML
200 INJECTION INTRAMUSCULAR; INTRAVENOUS ONCE
Status: COMPLETED | OUTPATIENT
Start: 2025-06-09 | End: 2025-06-09

## 2025-06-09 RX ORDER — INSULIN GLARGINE 100 [IU]/ML
30 INJECTION, SOLUTION SUBCUTANEOUS DAILY
COMMUNITY

## 2025-06-09 RX ORDER — MIRTAZAPINE 15 MG/1
15 TABLET, FILM COATED ORAL NIGHTLY
Status: DISCONTINUED | OUTPATIENT
Start: 2025-06-09 | End: 2025-06-13 | Stop reason: HOSPADM

## 2025-06-09 RX ORDER — ENOXAPARIN SODIUM 100 MG/ML
40 INJECTION SUBCUTANEOUS DAILY
Status: DISCONTINUED | OUTPATIENT
Start: 2025-06-09 | End: 2025-06-10

## 2025-06-09 RX ORDER — INSULIN GLARGINE 100 [IU]/ML
10 INJECTION, SOLUTION SUBCUTANEOUS NIGHTLY
Status: DISCONTINUED | OUTPATIENT
Start: 2025-06-10 | End: 2025-06-09

## 2025-06-09 RX ORDER — HYDROCORTISONE SODIUM SUCCINATE 100 MG/2ML
100 INJECTION INTRAMUSCULAR; INTRAVENOUS EVERY 8 HOURS
Status: DISCONTINUED | OUTPATIENT
Start: 2025-06-09 | End: 2025-06-09

## 2025-06-09 RX ORDER — MAGNESIUM SULFATE IN WATER 40 MG/ML
2000 INJECTION, SOLUTION INTRAVENOUS ONCE
Status: COMPLETED | OUTPATIENT
Start: 2025-06-09 | End: 2025-06-09

## 2025-06-09 RX ORDER — DIPHENHYDRAMINE HCL 25 MG
50 TABLET ORAL ONCE
Status: COMPLETED | OUTPATIENT
Start: 2025-06-09 | End: 2025-06-09

## 2025-06-09 RX ORDER — SERTRALINE HYDROCHLORIDE 100 MG/1
100 TABLET, FILM COATED ORAL DAILY
Status: DISCONTINUED | OUTPATIENT
Start: 2025-06-09 | End: 2025-06-13 | Stop reason: HOSPADM

## 2025-06-09 RX ORDER — FAMOTIDINE 20 MG/1
20 TABLET, FILM COATED ORAL 2 TIMES DAILY
COMMUNITY

## 2025-06-09 RX ORDER — SODIUM CHLORIDE, SODIUM LACTATE, POTASSIUM CHLORIDE, AND CALCIUM CHLORIDE .6; .31; .03; .02 G/100ML; G/100ML; G/100ML; G/100ML
500 INJECTION, SOLUTION INTRAVENOUS ONCE
Status: COMPLETED | OUTPATIENT
Start: 2025-06-09 | End: 2025-06-09

## 2025-06-09 RX ORDER — FENTANYL CITRATE 50 UG/ML
12.5 INJECTION, SOLUTION INTRAMUSCULAR; INTRAVENOUS
Status: DISCONTINUED | OUTPATIENT
Start: 2025-06-09 | End: 2025-06-13 | Stop reason: HOSPADM

## 2025-06-09 RX ORDER — FENTANYL 25 UG/1
1 PATCH TRANSDERMAL
COMMUNITY

## 2025-06-09 RX ORDER — INSULIN GLARGINE 100 [IU]/ML
10 INJECTION, SOLUTION SUBCUTANEOUS DAILY
Status: DISCONTINUED | OUTPATIENT
Start: 2025-06-10 | End: 2025-06-10

## 2025-06-09 RX ORDER — SENNA AND DOCUSATE SODIUM 50; 8.6 MG/1; MG/1
2 TABLET, FILM COATED ORAL 2 TIMES DAILY
COMMUNITY

## 2025-06-09 RX ORDER — IOPAMIDOL 755 MG/ML
75 INJECTION, SOLUTION INTRAVASCULAR
Status: COMPLETED | OUTPATIENT
Start: 2025-06-09 | End: 2025-06-09

## 2025-06-09 RX ORDER — TRAMADOL HYDROCHLORIDE 50 MG/1
50 TABLET ORAL EVERY 6 HOURS PRN
COMMUNITY

## 2025-06-09 RX ORDER — MIRABEGRON 50 MG/1
50 TABLET, FILM COATED, EXTENDED RELEASE ORAL DAILY
COMMUNITY

## 2025-06-09 RX ORDER — MAGNESIUM GLYCINATE 100 MG
200 CAPSULE ORAL DAILY
COMMUNITY

## 2025-06-09 RX ORDER — HYDROCORTISONE SODIUM SUCCINATE 100 MG/2ML
INJECTION INTRAMUSCULAR; INTRAVENOUS
Status: COMPLETED
Start: 2025-06-09 | End: 2025-06-09

## 2025-06-09 RX ORDER — SERTRALINE HYDROCHLORIDE 100 MG/1
100 TABLET, FILM COATED ORAL DAILY
COMMUNITY

## 2025-06-09 RX ORDER — TRAMADOL HYDROCHLORIDE 50 MG/1
50 TABLET ORAL EVERY 6 HOURS PRN
Status: DISCONTINUED | OUTPATIENT
Start: 2025-06-09 | End: 2025-06-13 | Stop reason: HOSPADM

## 2025-06-09 RX ADMIN — ONDANSETRON 4 MG: 2 INJECTION, SOLUTION INTRAMUSCULAR; INTRAVENOUS at 11:15

## 2025-06-09 RX ADMIN — MAGNESIUM SULFATE HEPTAHYDRATE 2000 MG: 40 INJECTION, SOLUTION INTRAVENOUS at 12:43

## 2025-06-09 RX ADMIN — SODIUM BICARBONATE: 84 INJECTION, SOLUTION INTRAVENOUS at 08:30

## 2025-06-09 RX ADMIN — FENTANYL CITRATE 12.5 MCG: 50 INJECTION INTRAMUSCULAR; INTRAVENOUS at 17:33

## 2025-06-09 RX ADMIN — INSULIN LISPRO 2 UNITS: 100 INJECTION, SOLUTION INTRAVENOUS; SUBCUTANEOUS at 17:32

## 2025-06-09 RX ADMIN — Medication 9 MG: at 20:57

## 2025-06-09 RX ADMIN — SODIUM BICARBONATE: 84 INJECTION, SOLUTION INTRAVENOUS at 17:32

## 2025-06-09 RX ADMIN — SODIUM CHLORIDE, PRESERVATIVE FREE 10 ML: 5 INJECTION INTRAVENOUS at 20:40

## 2025-06-09 RX ADMIN — FENTANYL CITRATE 12.5 MCG: 50 INJECTION INTRAMUSCULAR; INTRAVENOUS at 11:58

## 2025-06-09 RX ADMIN — SODIUM BICARBONATE: 84 INJECTION, SOLUTION INTRAVENOUS at 00:21

## 2025-06-09 RX ADMIN — INSULIN LISPRO 2 UNITS: 100 INJECTION, SOLUTION INTRAVENOUS; SUBCUTANEOUS at 06:06

## 2025-06-09 RX ADMIN — ANORECTAL OINTMENT: 15.7; .44; 24; 20.6 OINTMENT TOPICAL at 21:18

## 2025-06-09 RX ADMIN — FENTANYL CITRATE 12.5 MCG: 50 INJECTION INTRAMUSCULAR; INTRAVENOUS at 00:28

## 2025-06-09 RX ADMIN — SODIUM CHLORIDE, SODIUM LACTATE, POTASSIUM CHLORIDE, AND CALCIUM CHLORIDE 1000 ML: .6; .31; .03; .02 INJECTION, SOLUTION INTRAVENOUS at 00:00

## 2025-06-09 RX ADMIN — INSULIN LISPRO 2 UNITS: 100 INJECTION, SOLUTION INTRAVENOUS; SUBCUTANEOUS at 11:15

## 2025-06-09 RX ADMIN — METRONIDAZOLE 500 MG: 500 INJECTION, SOLUTION INTRAVENOUS at 14:21

## 2025-06-09 RX ADMIN — DEXTROSE MONOHYDRATE 250 ML: 100 INJECTION, SOLUTION INTRAVENOUS at 00:06

## 2025-06-09 RX ADMIN — DIPHENHYDRAMINE HCL 50 MG: 25 TABLET ORAL at 16:34

## 2025-06-09 RX ADMIN — HYDROCORTISONE SODIUM SUCCINATE 100 MG: 100 INJECTION, POWDER, FOR SOLUTION INTRAMUSCULAR; INTRAVENOUS at 09:11

## 2025-06-09 RX ADMIN — SODIUM CHLORIDE, PRESERVATIVE FREE 20 MG: 5 INJECTION INTRAVENOUS at 20:39

## 2025-06-09 RX ADMIN — HYDROCORTISONE SODIUM SUCCINATE 200 MG: 100 INJECTION, POWDER, FOR SOLUTION INTRAMUSCULAR; INTRAVENOUS at 12:39

## 2025-06-09 RX ADMIN — MIRTAZAPINE 15 MG: 15 TABLET, FILM COATED ORAL at 20:37

## 2025-06-09 RX ADMIN — METRONIDAZOLE 500 MG: 500 INJECTION, SOLUTION INTRAVENOUS at 06:11

## 2025-06-09 RX ADMIN — SODIUM CHLORIDE, PRESERVATIVE FREE 20 MG: 5 INJECTION INTRAVENOUS at 09:11

## 2025-06-09 RX ADMIN — SODIUM CHLORIDE, PRESERVATIVE FREE 10 ML: 5 INJECTION INTRAVENOUS at 09:11

## 2025-06-09 RX ADMIN — WATER 2000 MG: 1 INJECTION INTRAMUSCULAR; INTRAVENOUS; SUBCUTANEOUS at 21:49

## 2025-06-09 RX ADMIN — HYDROCORTISONE SODIUM SUCCINATE 100 MG: 100 INJECTION, POWDER, FOR SOLUTION INTRAMUSCULAR; INTRAVENOUS at 02:03

## 2025-06-09 RX ADMIN — SODIUM CHLORIDE, PRESERVATIVE FREE 10 ML: 5 INJECTION INTRAVENOUS at 09:12

## 2025-06-09 RX ADMIN — FENTANYL CITRATE 12.5 MCG: 50 INJECTION INTRAMUSCULAR; INTRAVENOUS at 23:49

## 2025-06-09 RX ADMIN — VASOPRESSIN 0.03 UNITS/MIN: 20 INJECTION INTRAVENOUS at 02:45

## 2025-06-09 RX ADMIN — ENOXAPARIN SODIUM 40 MG: 100 INJECTION SUBCUTANEOUS at 11:15

## 2025-06-09 RX ADMIN — SODIUM CHLORIDE 10 MCG/MIN: 0.9 INJECTION, SOLUTION INTRAVENOUS at 06:09

## 2025-06-09 RX ADMIN — HYDROCORTISONE SODIUM SUCCINATE 200 MG: 100 INJECTION INTRAMUSCULAR; INTRAVENOUS at 12:39

## 2025-06-09 RX ADMIN — IOPAMIDOL 75 ML: 755 INJECTION, SOLUTION INTRAVENOUS at 17:01

## 2025-06-09 RX ADMIN — FENTANYL CITRATE 12.5 MCG: 50 INJECTION INTRAMUSCULAR; INTRAVENOUS at 06:07

## 2025-06-09 RX ADMIN — CALCIUM GLUCONATE 2000 MG: 20 INJECTION, SOLUTION INTRAVENOUS at 00:07

## 2025-06-09 RX ADMIN — FENTANYL CITRATE 12.5 MCG: 50 INJECTION INTRAMUSCULAR; INTRAVENOUS at 14:23

## 2025-06-09 RX ADMIN — DEXMEDETOMIDINE 0.2 MCG/KG/HR: 100 INJECTION, SOLUTION INTRAVENOUS at 02:07

## 2025-06-09 RX ADMIN — METRONIDAZOLE 500 MG: 500 INJECTION, SOLUTION INTRAVENOUS at 21:49

## 2025-06-09 RX ADMIN — HYDROCORTISONE SODIUM SUCCINATE 200 MG: 100 INJECTION INTRAMUSCULAR; INTRAVENOUS at 16:34

## 2025-06-09 RX ADMIN — INSULIN LISPRO 2 UNITS: 100 INJECTION, SOLUTION INTRAVENOUS; SUBCUTANEOUS at 20:44

## 2025-06-09 RX ADMIN — SODIUM CHLORIDE, SODIUM LACTATE, POTASSIUM CHLORIDE, AND CALCIUM CHLORIDE 500 ML: .6; .31; .03; .02 INJECTION, SOLUTION INTRAVENOUS at 11:06

## 2025-06-09 RX ADMIN — FENTANYL CITRATE 12.5 MCG: 50 INJECTION INTRAMUSCULAR; INTRAVENOUS at 20:31

## 2025-06-09 RX ADMIN — BACLOFEN 10 MG: 10 TABLET ORAL at 20:37

## 2025-06-09 RX ADMIN — FENTANYL CITRATE 12.5 MCG: 50 INJECTION INTRAMUSCULAR; INTRAVENOUS at 09:35

## 2025-06-09 RX ADMIN — INSULIN GLARGINE 10 UNITS: 100 INJECTION, SOLUTION SUBCUTANEOUS at 20:45

## 2025-06-09 ASSESSMENT — PAIN SCALES - PAIN ASSESSMENT IN ADVANCED DEMENTIA (PAINAD)
BREATHING: NORMAL
NEGVOCALIZATION: OCCASIONAL MOAN/GROAN, LOW SPEECH, NEGATIVE/DISAPPROVING QUALITY
BODYLANGUAGE: TENSE, DISTRESSED PACING, FIDGETING
BODYLANGUAGE: RELAXED
TOTALSCORE: 2
NEGVOCALIZATION: REPEATED TROUBLED CALLING OUT, LOUD MOANING/GROANING, CRYING
NEGVOCALIZATION: OCCASIONAL MOAN/GROAN, LOW SPEECH, NEGATIVE/DISAPPROVING QUALITY
BREATHING: NORMAL
BREATHING: NORMAL
TOTALSCORE: 9
BODYLANGUAGE: RELAXED
CONSOLABILITY: UNABLE TO CONSOLE, DISTRACT OR REASSURE
FACIALEXPRESSION: SMILING OR INEXPRESSIVE
BREATHING: NORMAL
NEGVOCALIZATION: REPEATED TROUBLED CALLING OUT, LOUD MOANING/GROANING, CRYING
CONSOLABILITY: DISTRACTED OR REASSURED BY VOICE/TOUCH
BODYLANGUAGE: TENSE, DISTRESSED PACING, FIDGETING
CONSOLABILITY: DISTRACTED OR REASSURED BY VOICE/TOUCH
NEGVOCALIZATION: OCCASIONAL MOAN/GROAN, LOW SPEECH, NEGATIVE/DISAPPROVING QUALITY
TOTALSCORE: 7
FACIALEXPRESSION: SMILING OR INEXPRESSIVE
BREATHING: NOISY LABORED BREATHING, LONG PERIODS HYPERVENTILATION, CHEYNE-STOKES RESPIRATIONS
CONSOLABILITY: DISTRACTED OR REASSURED BY VOICE/TOUCH
BODYLANGUAGE: RELAXED
FACIALEXPRESSION: SMILING OR INEXPRESSIVE
TOTALSCORE: 2
FACIALEXPRESSION: FACIAL GRIMACING
FACIALEXPRESSION: FACIAL GRIMACING
CONSOLABILITY: UNABLE TO CONSOLE, DISTRACT OR REASSURE
TOTALSCORE: 2

## 2025-06-09 ASSESSMENT — ENCOUNTER SYMPTOMS
VOMITING: 1
DIARRHEA: 1
ABDOMINAL PAIN: 1
NAUSEA: 1

## 2025-06-09 ASSESSMENT — PAIN SCALES - GENERAL: PAINLEVEL_OUTOF10: 0

## 2025-06-09 NOTE — PATIENT CARE CONFERENCE
4 Eyes Skin Assessment     NAME:  Merissa Durbin  YOB: 1960  MEDICAL RECORD NUMBER:  [id number]    The patient is being assessed for  Admission    I agree that at least one RN has performed a thorough Head to Toe Skin Assessment on the patient. ALL assessment sites listed below have been assessed.      Areas assessed by both nurses:    Head, Face, Ears, Shoulders, Back, Chest, Arms, Elbows, Hands, Sacrum. Buttock, Coccyx, Ischium, Legs. Feet and Heels, and Under Medical Devices         Does the Patient have a Wound? Yes wound(s) were present on assessment. LDA wound assessment was Initiated and completed by RN       Yanick Prevention initiated by RN: Yes  Wound Care Orders initiated by RN: Yes    Pressure Injury (Stage 3,4, Unstageable, DTI, NWPT, and Complex wounds) if present, place Wound referral order by RN under : No    New Ostomies, if present place, Ostomy referral order under : No     Nurse 1 eSignature: Electronically signed by Ana Torrez RN on 6/9/25 at 4:09 AM EDT    **SHARE this note so that the co-signing nurse can place an eSignature**    Nurse 2 eSignature: Electronically signed by Remedios Fleming RN on 6/9/25 at 7:24 AM EDT

## 2025-06-09 NOTE — INTERDISCIPLINARY ROUNDS
Intensive Care Daily Quality Rounding Checklist      ICU Team Members: Dr. Booker, Residents Dr. Plascencia & Hector, Bedside nurse, Charge nurse, Clinical pharmacist, Respiratory therapist     ICU Day #: NUMBER: 2    SOFA Score: 12    Intubation Date: N/A    Ventilator Day #: N/A    Central Line Insertion Date: Kandace 8        Day #: NUMBER: 2        Indication: CVCIndication: Hemodynamically unstable requiring volume resuscitation     Arterial Line Insertion Date: Kandace 9      Day #: NUMBER: 1    Temporary Hemodialysis Catheter Insertion Date: N/A      Day # N/A    DVT Prophylaxis: Eliquis    GI Prophylaxis: Pepcid    Muhammad Catheter Insertion Date: Suprapubic catheter changed in ICU 6/8/2025       Day #: Chronic      Indications: Chronic - neurogenic bladder      Continued need (if yes, reason documented and discussed with physician): Chronic    Skin Issues/ Wounds and ordered treatment discussed on rounds: Yes, wound care consulted    Goals/ Plans for the Day: Monitor labs and vitals, treat/replace as needed.     Reviewed plan and goals for day with patient and/or representative: Yes, with daughter and son and patient.

## 2025-06-09 NOTE — CONSULTS
Palliative Care Department  [phone]  Palliative Care Initial Consult  Provider Ignacia Kong, APRN - CNP      PATIENT: Merissa Durbin  : 1960  MRN: [id number]  ADMISSION DATE: 2025  9:42 PM  Referring Provider: Sky Booker MD     Palliative Medicine was consulted on hospital day 1 for assistance with Goals of care, Code Status Discussion    HPI:     Clinical Summary:Merissa Durbin is a 64 y.o. y/o female with a history of  DVT, PE (on Eliquis), NIDDM, GERD, HTN, CVA, MS (total care)  who transferred to [facility] on 2025 from [place] ED with hypotension, vomiting, altered mental status, and diarrhea.  Workup was significant for a lactic acid of 16, WBCs 58.3, procalcitonin 6.7, potassium 5.1, bicarb 11.  Chest x-ray showed no acute abnormalities.  UA was negative.  CT of the abdomen showed colitis.  She was started on pressors and admitted to the ICU.    ASSESSMENT/PLAN:     Pertinent Hospital Diagnoses     Septic shock  Infectious colitis  Lactic acidosis    Palliative Care Encounter / Counseling Regarding Goals of Care  Please see detailed goals of care discussion as below  At this time, Merissa Durbin, Does Not have capacity for medical decision-making.  Capacity is time limited and situation/question specific  During encounter Todd was surrogate medical decision-maker  Outcome of goals of care meeting:  Continue with current medical management  Goal is to return to facility  Send confirmed CODE STATUS is a limited DNR CCA/DNI okay for resuscitative meds  Code status Limited no intubation, no compressions, no shock.  Okay for resuscitative medications  Advanced Directives: no POA or living will in [facility]  Surrogate/Legal NOK:  Simon Gifty () [phone]  Key Durbin (daughter) [phone]  Todd Durbin (son) [phone]    Spiritual assessment: no spiritual distress identified  Bereavement and grief: to be determined  Referrals to: none today    Thank you for 
Hospital:  Urine culture 6/8/2025: >100 K swarming GNR  Blood cultures 6/8/2025: Negative so far    Workability:  C. difficile toxin assay: Negative  Nares screening MRSA: Pending  Stool culture:    Recent Labs     06/08/25  1649 06/09/25  0500   PROCAL 6.70* 14.70*       Assessment:  Acute colitis.  Inflammatory, ischemic versus infectious.  C. difficile has been ruled out.  This could be enterotoxigenic E. coli or others  Possible complicated UTI with sepsis secondary to Proteus associated to the above  Leukemoid reaction secondary to colitis  Sepsis with septic shock associated to the above    Plan:    Continue Ceftriaxone  Continue IV Metronidazole  Check cultures, baseline ESR, CRP  Contact isolation  Will follow with you    Please note that 30 minutes were spent on this case in regards to the intensity and complexity inherent to hospital inpatient or observation care associated with a confirmed or suspected infectious disease.  This included education to patient/representative and/for hospital staff in regards to disease transmission risk assessment and mitigation, public health investigation, analysis and testing, and complex antimicrobial therapy counseling and treatment.  Spoke with nursing.  Spoke with son.    Thank you for having us see this patient in consultation. I will be discussing this case with the treating physicians or communicated through the electronic health record.    Kamlesh Mustafa MD  10:44 AM  6/9/2025  
midline  Lungs: symmetric chest rise, no audible wheezes  Heart: warm throughout  Abdomen: soft, non-distended, mild diffuse TTP, suprapubic catheter in place with yellow urine in bag   Skin: warm and dry  Extremities: atraumatic    Assessment:  Merissa Durbin is a 64 y.o. female with septic shock being evaluated for colitis       Plan:  - ID following, continue Abx currently on ceftriaxone and Flagyl   - Pain and nausea control prn   - Will repeat CTAP  - monitor WBC and lactate   - Appreciate MICU care   - Further plans pending CTAP     Discussed with Dr. Sumanth Wen MD  PGY-1 General Surgery Resident     The patient was seen and examined and the chart was reviewed.  I agree with the assessment and plan.  The patient does have colitis.  The C. difficile toxin was negative.  The patient is on ceftriaxone and Flagyl.  The patient has had a modest drop in her white blood cell count.  Continue conservative management  Serial examinations  Justyn Julio MD  
but uncertain if still on, will verify with facility)  -Continue home Baclofen, Citalopram, Remeron     Respiratory   -stable on RA  -Encourage IS and mobilization     Cardiovascular   #Septic Shock  #Hx of HTN  -given 2L LR at [place] ED, still tachycardic and on high dose norepinephrine gtt, will further resuscitate and continue to monitor  -2L LR now, bicarb gtt @125ml/hr  -Norepinephrine gtt for MAP >65, consider adding Vaso gtt if remains high dose after IVF  -Hold home Lisinopril and Furosemide  -No previous ECHO on file at this facility, will order one here  -troponin slightly elevated, will continue to trend  -Hydrocortisone 100mg q8h     Gastrointestinal   #Infectious Colitis  -Zofran/Compazine PRN for nausea/vomiting  -abx as below  -CLD for now     Renal/   #NAGMA  #Lactic Acidosis  #Neurogenic Bladder s/p suprapubic catheter  #Hyperkalemia  -BUN/Cr WNL, continue to monitor with serial labs  -Avoid nephrotoxins  -Strict I&O  -Hold home Furosemide  -acidosis due to sepsis/diarrhea/vomiting and dehydration, continue to monitor with resuscitation  -Catheter changed on admission to [place]  -D10W & insulin, Calcium, Bicarb, hold on oral meds at this time     Infectious Disease   #Septic Shock from Infectious Colitis  -Noted on CT A/P from [place] ER  -Procalcitonin 6.7, repeat pending for am  -Ceftriaxone and Flagyl   -Stool culture and Cdiff pending  -UA negative for infection, Muhammad changed and will repeat UA  -Blood cultures from [facility] pending from 6/8, plan to call in am for results     Hematology/Oncology   #Hx of DVT/PE  -Continue home Apixiban 2.5mg BID  -Transfuse for Hgb <7.0  -CBC daily     Endocrine/Rheumatoid   #NIDDM  -Hold home Metformin  -SSI  -A1C pending    Social/Spiritual/DNR/Other   -Daughter updated at      VTE prophylaxis: Apixaban (if able to swallow)  PUD prophylaxis: Famotidine  Code Status: DNR-CCA, confirmed with daughter     Discussed with Dr. Miles    \"Thank you for

## 2025-06-09 NOTE — PROCEDURES
PROCEDURE NOTE  Date: 6/9/2025   Name: Merissa Durbin  YOB: 1960    Insert Arterial Line    Date/Time: 6/9/2025 12:51 AM    Performed by: Edelmira Pak APRN - CNP  Authorized by: Edelmira Pak APRN - CNP  Consent: Verbal consent obtained. Written consent obtained.  Risks and benefits: risks, benefits and alternatives were discussed  Consent given by: komal.  Patient understanding: patient states understanding of the procedure being performed  Patient consent: the patient's understanding of the procedure matches consent given  Procedure consent: procedure consent matches procedure scheduled  Relevant documents: relevant documents present and verified  Test results: test results available and properly labeled  Site marked: the operative site was marked  Imaging studies: imaging studies available  Patient identity confirmed: arm band  Preparation: Patient was prepped and draped in the usual sterile fashion.  Indications: hemodynamic monitoring  Location: left radial  Anesthesia: local infiltration    Anesthesia:  Local Anesthetic: lidocaine 1% without epinephrine  Anesthetic total: 2 mL    Sedation:  Patient sedated: no    Ricardo's test normal: yes  Needle gauge: 20  Seldinger technique: Seldinger technique used  Number of attempts: 4  Post-procedure: line sutured and dressing applied  Post-procedure CMS: normal  Patient tolerance: patient tolerated the procedure well with no immediate complications  Comments: Difficult access due to positioning

## 2025-06-09 NOTE — DISCHARGE INSTR - COC
***    Physician Certification: I certify the above information and transfer of Merissa Durbin  is necessary for the continuing treatment of the diagnosis listed and that she requires Skilled Nursing Facility for less 30 days.     Update Admission H&P: {CHP DME Changes in HandP:[id number]}    PHYSICIAN SIGNATURE:  {Esignature:[id number]}

## 2025-06-09 NOTE — ACP (ADVANCE CARE PLANNING)
Advance Care Planning   Healthcare Decision Maker:    Primary Decision Maker: Key Durbin - Child - [phone]    Secondary Decision Maker: Todd Durbin - Child - [phone]    Click here to complete Healthcare Decision Makers including selection of the Healthcare Decision Maker Relationship (ie \"Primary\").

## 2025-06-09 NOTE — H&P
cultures.    TRIP  -secondary to sepsis with metabolic acidosis and hyperkalemia  Potassium improved to 5 from 5.5.  On bicarb drip. Bicarb improved 18 from 9.  Creat 0.5. baseline 0.2-0.6.    Metabolic encephalopathy  -from sepsis. Hx MS.  -monitor as improves.     DM2  On metformin - held.  Glucose 290-300.  On stress steroids.  Holding TF.  On SSI. May need to add lantus if persist elevated.      Consults - palliative, critical care, ID, surgery.  Code - limited.  DVT prophylaxis - lovenox  PT OT  Discharge plan - [place]         Zaheer Taylor MD  6/9/2025  4:48 PM

## 2025-06-09 NOTE — CARE COORDINATION
Social Work:    Chart reviewed. Merissa was transferred to [facility] from [facility]. She resides at [facility].  Merissa is being treated for septic shock, colitis, possible complicated UTI. Medical hx includes, CVA, DM, GERD, HLD, HTN, DVT's on Eliquis.  Social service attempted to meet with Merissa but she is presently confused. A call was placed to Key, Merissa's daughter. Social service advised Key about Care Management department and confirmed plans to return to [place] snf/ICF when stable. Merissa advises that she is the primary contact, her brother Farshad is second, and father has dementia, therefore, should not be contacted.  [facility] return status.  (N-17, ambulance started)    Electronically signed by COLEMAN Cleary on 6/9/2025 at 2:33 PM

## 2025-06-10 PROBLEM — B96.4 URINARY TRACT INFECTION DUE TO PROTEUS: Status: ACTIVE | Noted: 2025-06-10

## 2025-06-10 PROBLEM — N39.0 URINARY TRACT INFECTION DUE TO PROTEUS: Status: ACTIVE | Noted: 2025-06-10

## 2025-06-10 LAB
ALBUMIN SERPL-MCNC: 3.1 G/DL (ref 3.5–5.2)
ALP SERPL-CCNC: 113 U/L (ref 35–104)
ALT SERPL-CCNC: 15 U/L (ref 0–32)
ANION GAP SERPL CALCULATED.3IONS-SCNC: 13 MMOL/L (ref 7–16)
ANION GAP SERPL CALCULATED.3IONS-SCNC: 15 MMOL/L (ref 7–16)
AST SERPL-CCNC: 28 U/L (ref 0–31)
B-OH-BUTYR SERPL-MCNC: 0.14 MMOL/L (ref 0.02–0.27)
BASOPHILS # BLD: 0.03 K/UL (ref 0–0.2)
BASOPHILS NFR BLD: 0 % (ref 0–2)
BILIRUB SERPL-MCNC: 0.2 MG/DL (ref 0–1.2)
BUN SERPL-MCNC: 7 MG/DL (ref 6–23)
BUN SERPL-MCNC: 8 MG/DL (ref 6–23)
CALCIUM SERPL-MCNC: 7.5 MG/DL (ref 8.6–10.2)
CALCIUM SERPL-MCNC: 7.5 MG/DL (ref 8.6–10.2)
CHLORIDE SERPL-SCNC: 88 MMOL/L (ref 98–107)
CHLORIDE SERPL-SCNC: 93 MMOL/L (ref 98–107)
CO2 SERPL-SCNC: 34 MMOL/L (ref 22–29)
CO2 SERPL-SCNC: 36 MMOL/L (ref 22–29)
CREAT SERPL-MCNC: 0.3 MG/DL (ref 0.5–1)
CREAT SERPL-MCNC: 0.4 MG/DL (ref 0.5–1)
CRP SERPL HS-MCNC: 143 MG/L (ref 0–5)
EOSINOPHIL # BLD: 0 K/UL (ref 0.05–0.5)
EOSINOPHILS RELATIVE PERCENT: 0 % (ref 0–6)
ERYTHROCYTE [DISTWIDTH] IN BLOOD BY AUTOMATED COUNT: 22.5 % (ref 11.5–15)
ERYTHROCYTE [SEDIMENTATION RATE] IN BLOOD BY WESTERGREN METHOD: 25 MM/HR (ref 0–20)
GFR, ESTIMATED: >90 ML/MIN/1.73M2
GFR, ESTIMATED: >90 ML/MIN/1.73M2
GLUCOSE BLD-MCNC: 118 MG/DL (ref 74–99)
GLUCOSE BLD-MCNC: 142 MG/DL (ref 74–99)
GLUCOSE BLD-MCNC: 187 MG/DL (ref 74–99)
GLUCOSE SERPL-MCNC: 122 MG/DL (ref 74–99)
GLUCOSE SERPL-MCNC: 126 MG/DL (ref 74–99)
HCT VFR BLD AUTO: 30 % (ref 34–48)
HGB BLD-MCNC: 9.8 G/DL (ref 11.5–15.5)
IMM GRANULOCYTES # BLD AUTO: 0.13 K/UL (ref 0–0.58)
IMM GRANULOCYTES NFR BLD: 1 % (ref 0–5)
LACTATE BLDV-SCNC: 0.7 MMOL/L (ref 0.5–2.2)
LACTATE BLDV-SCNC: 1.1 MMOL/L (ref 0.5–2.2)
LACTATE BLDV-SCNC: 2.9 MMOL/L (ref 0.5–2.2)
LYMPHOCYTES NFR BLD: 1.21 K/UL (ref 1.5–4)
LYMPHOCYTES RELATIVE PERCENT: 6 % (ref 20–42)
MAGNESIUM SERPL-MCNC: 2.1 MG/DL (ref 1.6–2.6)
MCH RBC QN AUTO: 26.1 PG (ref 26–35)
MCHC RBC AUTO-ENTMCNC: 32.7 G/DL (ref 32–34.5)
MCV RBC AUTO: 79.8 FL (ref 80–99.9)
MICROORGANISM SPEC CULT: NORMAL
MONOCYTES NFR BLD: 1.31 K/UL (ref 0.1–0.95)
MONOCYTES NFR BLD: 6 % (ref 2–12)
NEUTROPHILS NFR BLD: 88 % (ref 43–80)
NEUTS SEG NFR BLD: 18.87 K/UL (ref 1.8–7.3)
PHOSPHATE SERPL-MCNC: 4 MG/DL (ref 2.5–4.5)
PLATELET # BLD AUTO: 227 K/UL (ref 130–450)
PMV BLD AUTO: 10.3 FL (ref 7–12)
POTASSIUM SERPL-SCNC: 2.3 MMOL/L (ref 3.5–5)
POTASSIUM SERPL-SCNC: 2.6 MMOL/L (ref 3.5–5)
POTASSIUM SERPL-SCNC: 3.9 MMOL/L (ref 3.5–5)
PROT SERPL-MCNC: 6.1 G/DL (ref 6.4–8.3)
RBC # BLD AUTO: 3.76 M/UL (ref 3.5–5.5)
RBC # BLD: ABNORMAL 10*6/UL
SODIUM SERPL-SCNC: 139 MMOL/L (ref 132–146)
SODIUM SERPL-SCNC: 140 MMOL/L (ref 132–146)
SPECIMEN DESCRIPTION: NORMAL
TROPONIN I SERPL HS-MCNC: 31 NG/L (ref 0–14)
WBC OTHER # BLD: 21.6 K/UL (ref 4.5–11.5)

## 2025-06-10 PROCEDURE — 85025 COMPLETE CBC W/AUTO DIFF WBC: CPT

## 2025-06-10 PROCEDURE — 6370000000 HC RX 637 (ALT 250 FOR IP): Performed by: INTERNAL MEDICINE

## 2025-06-10 PROCEDURE — 85652 RBC SED RATE AUTOMATED: CPT

## 2025-06-10 PROCEDURE — 82962 GLUCOSE BLOOD TEST: CPT

## 2025-06-10 PROCEDURE — 83735 ASSAY OF MAGNESIUM: CPT

## 2025-06-10 PROCEDURE — 6360000002 HC RX W HCPCS: Performed by: INTERNAL MEDICINE

## 2025-06-10 PROCEDURE — 80048 BASIC METABOLIC PNL TOTAL CA: CPT

## 2025-06-10 PROCEDURE — 82010 KETONE BODYS QUAN: CPT

## 2025-06-10 PROCEDURE — 2500000003 HC RX 250 WO HCPCS: Performed by: NURSE PRACTITIONER

## 2025-06-10 PROCEDURE — 83605 ASSAY OF LACTIC ACID: CPT

## 2025-06-10 PROCEDURE — 6360000002 HC RX W HCPCS: Performed by: NURSE PRACTITIONER

## 2025-06-10 PROCEDURE — 84484 ASSAY OF TROPONIN QUANT: CPT

## 2025-06-10 PROCEDURE — 2700000000 HC OXYGEN THERAPY PER DAY

## 2025-06-10 PROCEDURE — 2060000000 HC ICU INTERMEDIATE R&B

## 2025-06-10 PROCEDURE — 92610 EVALUATE SWALLOWING FUNCTION: CPT

## 2025-06-10 PROCEDURE — 86140 C-REACTIVE PROTEIN: CPT

## 2025-06-10 PROCEDURE — 84100 ASSAY OF PHOSPHORUS: CPT

## 2025-06-10 PROCEDURE — 2580000003 HC RX 258: Performed by: NURSE PRACTITIONER

## 2025-06-10 PROCEDURE — 2500000003 HC RX 250 WO HCPCS: Performed by: INTERNAL MEDICINE

## 2025-06-10 PROCEDURE — 84132 ASSAY OF SERUM POTASSIUM: CPT

## 2025-06-10 PROCEDURE — 6370000000 HC RX 637 (ALT 250 FOR IP): Performed by: NURSE PRACTITIONER

## 2025-06-10 PROCEDURE — 80053 COMPREHEN METABOLIC PANEL: CPT

## 2025-06-10 RX ORDER — 0.9 % SODIUM CHLORIDE 0.9 %
500 INTRAVENOUS SOLUTION INTRAVENOUS ONCE
Status: COMPLETED | OUTPATIENT
Start: 2025-06-10 | End: 2025-06-10

## 2025-06-10 RX ORDER — POTASSIUM CHLORIDE 1500 MG/1
40 TABLET, EXTENDED RELEASE ORAL ONCE
Status: DISCONTINUED | OUTPATIENT
Start: 2025-06-10 | End: 2025-06-10

## 2025-06-10 RX ORDER — POTASSIUM CHLORIDE 29.8 MG/ML
40 INJECTION INTRAVENOUS ONCE
Status: COMPLETED | OUTPATIENT
Start: 2025-06-10 | End: 2025-06-10

## 2025-06-10 RX ORDER — HYDROCORTISONE SODIUM SUCCINATE 100 MG/2ML
50 INJECTION INTRAMUSCULAR; INTRAVENOUS DAILY
Status: COMPLETED | OUTPATIENT
Start: 2025-06-12 | End: 2025-06-13

## 2025-06-10 RX ORDER — HYDROCORTISONE SODIUM SUCCINATE 100 MG/2ML
50 INJECTION INTRAMUSCULAR; INTRAVENOUS EVERY 12 HOURS
Status: COMPLETED | OUTPATIENT
Start: 2025-06-10 | End: 2025-06-12

## 2025-06-10 RX ORDER — INSULIN GLARGINE 100 [IU]/ML
10 INJECTION, SOLUTION SUBCUTANEOUS NIGHTLY
Status: DISCONTINUED | OUTPATIENT
Start: 2025-06-10 | End: 2025-06-13 | Stop reason: HOSPADM

## 2025-06-10 RX ADMIN — SODIUM CHLORIDE: 0.9 INJECTION, SOLUTION INTRAVENOUS at 04:03

## 2025-06-10 RX ADMIN — MICONAZOLE NITRATE: 20 POWDER TOPICAL at 20:22

## 2025-06-10 RX ADMIN — POTASSIUM CHLORIDE 20 MEQ: 29.8 INJECTION, SOLUTION INTRAVENOUS at 12:24

## 2025-06-10 RX ADMIN — POTASSIUM CHLORIDE 40 MEQ: 29.8 INJECTION, SOLUTION INTRAVENOUS at 16:50

## 2025-06-10 RX ADMIN — MIRTAZAPINE 15 MG: 15 TABLET, FILM COATED ORAL at 20:20

## 2025-06-10 RX ADMIN — Medication 9 MG: at 20:20

## 2025-06-10 RX ADMIN — HYDROCORTISONE SODIUM SUCCINATE 50 MG: 100 INJECTION INTRAMUSCULAR; INTRAVENOUS at 11:24

## 2025-06-10 RX ADMIN — POTASSIUM CHLORIDE 20 MEQ: 29.8 INJECTION, SOLUTION INTRAVENOUS at 15:22

## 2025-06-10 RX ADMIN — METRONIDAZOLE 500 MG: 500 INJECTION, SOLUTION INTRAVENOUS at 22:45

## 2025-06-10 RX ADMIN — POTASSIUM CHLORIDE 20 MEQ: 29.8 INJECTION, SOLUTION INTRAVENOUS at 06:53

## 2025-06-10 RX ADMIN — ANORECTAL OINTMENT: 15.7; .44; 24; 20.6 OINTMENT TOPICAL at 09:15

## 2025-06-10 RX ADMIN — ANORECTAL OINTMENT: 15.7; .44; 24; 20.6 OINTMENT TOPICAL at 14:27

## 2025-06-10 RX ADMIN — METRONIDAZOLE 500 MG: 500 INJECTION, SOLUTION INTRAVENOUS at 14:07

## 2025-06-10 RX ADMIN — SODIUM CHLORIDE, PRESERVATIVE FREE 20 MG: 5 INJECTION INTRAVENOUS at 20:21

## 2025-06-10 RX ADMIN — SODIUM CHLORIDE 500 ML: 0.9 INJECTION, SOLUTION INTRAVENOUS at 05:43

## 2025-06-10 RX ADMIN — ENOXAPARIN SODIUM 40 MG: 100 INJECTION SUBCUTANEOUS at 09:00

## 2025-06-10 RX ADMIN — INSULIN GLARGINE 10 UNITS: 100 INJECTION, SOLUTION SUBCUTANEOUS at 21:04

## 2025-06-10 RX ADMIN — POTASSIUM CHLORIDE 20 MEQ: 29.8 INJECTION, SOLUTION INTRAVENOUS at 05:32

## 2025-06-10 RX ADMIN — POTASSIUM CHLORIDE 20 MEQ: 29.8 INJECTION, SOLUTION INTRAVENOUS at 14:01

## 2025-06-10 RX ADMIN — SODIUM CHLORIDE, PRESERVATIVE FREE 10 ML: 5 INJECTION INTRAVENOUS at 09:16

## 2025-06-10 RX ADMIN — INSULIN LISPRO 1 UNITS: 100 INJECTION, SOLUTION INTRAVENOUS; SUBCUTANEOUS at 16:38

## 2025-06-10 RX ADMIN — MICONAZOLE NITRATE: 20 POWDER TOPICAL at 16:37

## 2025-06-10 RX ADMIN — APIXABAN 2.5 MG: 2.5 TABLET, FILM COATED ORAL at 21:05

## 2025-06-10 RX ADMIN — SODIUM CHLORIDE, PRESERVATIVE FREE 20 MG: 5 INJECTION INTRAVENOUS at 09:00

## 2025-06-10 RX ADMIN — METRONIDAZOLE 500 MG: 500 INJECTION, SOLUTION INTRAVENOUS at 05:38

## 2025-06-10 RX ADMIN — WATER 2000 MG: 1 INJECTION INTRAMUSCULAR; INTRAVENOUS; SUBCUTANEOUS at 22:46

## 2025-06-10 RX ADMIN — FENTANYL CITRATE 12.5 MCG: 50 INJECTION INTRAMUSCULAR; INTRAVENOUS at 06:01

## 2025-06-10 RX ADMIN — BACLOFEN 10 MG: 10 TABLET ORAL at 20:20

## 2025-06-10 RX ADMIN — SODIUM CHLORIDE, PRESERVATIVE FREE 10 ML: 5 INJECTION INTRAVENOUS at 20:22

## 2025-06-10 RX ADMIN — ANORECTAL OINTMENT: 15.7; .44; 24; 20.6 OINTMENT TOPICAL at 20:21

## 2025-06-10 RX ADMIN — POTASSIUM CHLORIDE 20 MEQ: 29.8 INJECTION, SOLUTION INTRAVENOUS at 07:50

## 2025-06-10 ASSESSMENT — PAIN SCALES - PAIN ASSESSMENT IN ADVANCED DEMENTIA (PAINAD)
BREATHING: NORMAL
NEGVOCALIZATION: OCCASIONAL MOAN/GROAN, LOW SPEECH, NEGATIVE/DISAPPROVING QUALITY
TOTALSCORE: 1
CONSOLABILITY: NO NEED TO CONSOLE
NEGVOCALIZATION: OCCASIONAL MOAN/GROAN, LOW SPEECH, NEGATIVE/DISAPPROVING QUALITY
BODYLANGUAGE: RELAXED
TOTALSCORE: 2
BODYLANGUAGE: RELAXED
NEGVOCALIZATION: OCCASIONAL MOAN/GROAN, LOW SPEECH, NEGATIVE/DISAPPROVING QUALITY
BREATHING: NORMAL
NEGVOCALIZATION: OCCASIONAL MOAN/GROAN, LOW SPEECH, NEGATIVE/DISAPPROVING QUALITY
CONSOLABILITY: NO NEED TO CONSOLE
TOTALSCORE: 1
CONSOLABILITY: DISTRACTED OR REASSURED BY VOICE/TOUCH
BODYLANGUAGE: RELAXED
BODYLANGUAGE: RELAXED
CONSOLABILITY: NO NEED TO CONSOLE
BREATHING: NORMAL
CONSOLABILITY: NO NEED TO CONSOLE
TOTALSCORE: 1
BREATHING: NORMAL
FACIALEXPRESSION: SMILING OR INEXPRESSIVE
CONSOLABILITY: NO NEED TO CONSOLE
FACIALEXPRESSION: SMILING OR INEXPRESSIVE
NEGVOCALIZATION: OCCASIONAL MOAN/GROAN, LOW SPEECH, NEGATIVE/DISAPPROVING QUALITY
TOTALSCORE: 1
FACIALEXPRESSION: SMILING OR INEXPRESSIVE
BREATHING: NORMAL
BREATHING: NORMAL
BODYLANGUAGE: RELAXED
FACIALEXPRESSION: SMILING OR INEXPRESSIVE
BODYLANGUAGE: RELAXED
TOTALSCORE: 0
FACIALEXPRESSION: SMILING OR INEXPRESSIVE
FACIALEXPRESSION: SMILING OR INEXPRESSIVE

## 2025-06-10 ASSESSMENT — PAIN SCALES - GENERAL
PAINLEVEL_OUTOF10: 0

## 2025-06-10 NOTE — CARE COORDINATION
Social Work:    [facility] confirmed bedhold and possible snf return. No hold needed for auth.  (N-17, ambulance form started)    Electronically signed by COLEMAN Cleary on 6/10/2025 at 8:00 AM

## 2025-06-10 NOTE — PATIENT CARE CONFERENCE
Intensive Care Daily Quality Rounding Checklist        ICU Team Members: Dr. Booker, Residents Dr. Plascencia & Hector, Bedside nurse, Charge nurse, Clinical pharmacist, Respiratory therapist      ICU Day #: NUMBER: 2     SOFA Score: 8     Intubation Date: N/A     Ventilator Day #: N/A     Central Line Insertion Date: Kandace 8                                                    Day #: NUMBER: 3                                                    Indication: CVCIndication: Hemodynamically unstable requiring volume resuscitation      Arterial Line Insertion Date: Kandace 9                             Day #: NUMBER: 2     Temporary Hemodialysis Catheter Insertion Date: N/A                             Day # N/A     DVT Prophylaxis: Eliquis     GI Prophylaxis: Pepcid     Muhammad Catheter Insertion Date: Suprapubic catheter changed in ICU 6/8/2025                                        Day #: Chronic                             Indications: Chronic - neurogenic bladder                             Continued need (if yes, reason documented and discussed with physician): Chronic     Skin Issues/ Wounds and ordered treatment discussed on rounds: Yes, wound care consulted     Goals/ Plans for the Day: Monitor labs and vitals, treat/replace as needed. Transfer to intermediate. Resume steroids.     Reviewed plan and goals for day with patient and/or representative: No

## 2025-06-11 LAB
ALBUMIN SERPL-MCNC: 3 G/DL (ref 3.5–5.2)
ALP SERPL-CCNC: 104 U/L (ref 35–104)
ALT SERPL-CCNC: 14 U/L (ref 0–32)
ANION GAP SERPL CALCULATED.3IONS-SCNC: 14 MMOL/L (ref 7–16)
AST SERPL-CCNC: 27 U/L (ref 0–31)
BASOPHILS # BLD: 0.04 K/UL (ref 0–0.2)
BASOPHILS NFR BLD: 0 % (ref 0–2)
BILIRUB SERPL-MCNC: 0.2 MG/DL (ref 0–1.2)
BUN SERPL-MCNC: 5 MG/DL (ref 6–23)
CALCIUM SERPL-MCNC: 7.8 MG/DL (ref 8.6–10.2)
CHLORIDE SERPL-SCNC: 103 MMOL/L (ref 98–107)
CO2 SERPL-SCNC: 29 MMOL/L (ref 22–29)
CREAT SERPL-MCNC: 0.3 MG/DL (ref 0.5–1)
EKG ATRIAL RATE: 112 BPM
EKG P AXIS: 44 DEGREES
EKG P-R INTERVAL: 170 MS
EKG Q-T INTERVAL: 352 MS
EKG QRS DURATION: 108 MS
EKG QTC CALCULATION (BAZETT): 480 MS
EKG R AXIS: 36 DEGREES
EKG T AXIS: 98 DEGREES
EKG VENTRICULAR RATE: 112 BPM
EOSINOPHIL # BLD: 0.18 K/UL (ref 0.05–0.5)
EOSINOPHILS RELATIVE PERCENT: 1 % (ref 0–6)
ERYTHROCYTE [DISTWIDTH] IN BLOOD BY AUTOMATED COUNT: 22.6 % (ref 11.5–15)
GFR, ESTIMATED: >90 ML/MIN/1.73M2
GLUCOSE BLD-MCNC: 137 MG/DL (ref 74–99)
GLUCOSE BLD-MCNC: 193 MG/DL (ref 74–99)
GLUCOSE SERPL-MCNC: 118 MG/DL (ref 74–99)
HCT VFR BLD AUTO: 28.2 % (ref 34–48)
HGB BLD-MCNC: 9 G/DL (ref 11.5–15.5)
IMM GRANULOCYTES # BLD AUTO: 0.08 K/UL (ref 0–0.58)
IMM GRANULOCYTES NFR BLD: 1 % (ref 0–5)
IRON SATN MFR SERPL: 13 % (ref 15–50)
IRON SERPL-MCNC: 31 UG/DL (ref 37–145)
LYMPHOCYTES NFR BLD: 1.88 K/UL (ref 1.5–4)
LYMPHOCYTES RELATIVE PERCENT: 13 % (ref 20–42)
MAGNESIUM SERPL-MCNC: 1.8 MG/DL (ref 1.6–2.6)
MCH RBC QN AUTO: 26.4 PG (ref 26–35)
MCHC RBC AUTO-ENTMCNC: 31.9 G/DL (ref 32–34.5)
MCV RBC AUTO: 82.7 FL (ref 80–99.9)
MONOCYTES NFR BLD: 0.81 K/UL (ref 0.1–0.95)
MONOCYTES NFR BLD: 6 % (ref 2–12)
NEUTROPHILS NFR BLD: 79 % (ref 43–80)
NEUTS SEG NFR BLD: 11.1 K/UL (ref 1.8–7.3)
PHOSPHATE SERPL-MCNC: 2.2 MG/DL (ref 2.5–4.5)
PLATELET # BLD AUTO: 189 K/UL (ref 130–450)
PMV BLD AUTO: 10.4 FL (ref 7–12)
POTASSIUM SERPL-SCNC: 3 MMOL/L (ref 3.5–5)
POTASSIUM SERPL-SCNC: 3.9 MMOL/L (ref 3.5–5)
PROT SERPL-MCNC: 5.8 G/DL (ref 6.4–8.3)
RBC # BLD AUTO: 3.41 M/UL (ref 3.5–5.5)
RBC # BLD: ABNORMAL 10*6/UL
SODIUM SERPL-SCNC: 146 MMOL/L (ref 132–146)
TIBC SERPL-MCNC: 230 UG/DL (ref 250–450)
WBC OTHER # BLD: 14.1 K/UL (ref 4.5–11.5)

## 2025-06-11 PROCEDURE — 82962 GLUCOSE BLOOD TEST: CPT

## 2025-06-11 PROCEDURE — 83550 IRON BINDING TEST: CPT

## 2025-06-11 PROCEDURE — 6370000000 HC RX 637 (ALT 250 FOR IP): Performed by: SPECIALIST

## 2025-06-11 PROCEDURE — 84100 ASSAY OF PHOSPHORUS: CPT

## 2025-06-11 PROCEDURE — 6370000000 HC RX 637 (ALT 250 FOR IP): Performed by: NURSE PRACTITIONER

## 2025-06-11 PROCEDURE — 92526 ORAL FUNCTION THERAPY: CPT

## 2025-06-11 PROCEDURE — 84132 ASSAY OF SERUM POTASSIUM: CPT

## 2025-06-11 PROCEDURE — 2060000000 HC ICU INTERMEDIATE R&B

## 2025-06-11 PROCEDURE — 83540 ASSAY OF IRON: CPT

## 2025-06-11 PROCEDURE — 6360000002 HC RX W HCPCS: Performed by: INTERNAL MEDICINE

## 2025-06-11 PROCEDURE — 6370000000 HC RX 637 (ALT 250 FOR IP): Performed by: INTERNAL MEDICINE

## 2025-06-11 PROCEDURE — 85025 COMPLETE CBC W/AUTO DIFF WBC: CPT

## 2025-06-11 PROCEDURE — 2500000003 HC RX 250 WO HCPCS: Performed by: INTERNAL MEDICINE

## 2025-06-11 PROCEDURE — 80053 COMPREHEN METABOLIC PANEL: CPT

## 2025-06-11 PROCEDURE — 83735 ASSAY OF MAGNESIUM: CPT

## 2025-06-11 RX ORDER — METRONIDAZOLE 500 MG/1
500 TABLET ORAL EVERY 8 HOURS SCHEDULED
Status: DISCONTINUED | OUTPATIENT
Start: 2025-06-11 | End: 2025-06-13

## 2025-06-11 RX ORDER — CEFDINIR 300 MG/1
300 CAPSULE ORAL EVERY 12 HOURS SCHEDULED
Status: DISCONTINUED | OUTPATIENT
Start: 2025-06-11 | End: 2025-06-13 | Stop reason: HOSPADM

## 2025-06-11 RX ORDER — FAMOTIDINE 20 MG/1
20 TABLET, FILM COATED ORAL 2 TIMES DAILY
Status: DISCONTINUED | OUTPATIENT
Start: 2025-06-11 | End: 2025-06-13 | Stop reason: HOSPADM

## 2025-06-11 RX ADMIN — ONDANSETRON 4 MG: 2 INJECTION, SOLUTION INTRAMUSCULAR; INTRAVENOUS at 12:25

## 2025-06-11 RX ADMIN — SODIUM CHLORIDE, PRESERVATIVE FREE 10 ML: 5 INJECTION INTRAVENOUS at 20:34

## 2025-06-11 RX ADMIN — BACLOFEN 10 MG: 10 TABLET ORAL at 14:27

## 2025-06-11 RX ADMIN — BACLOFEN 10 MG: 10 TABLET ORAL at 08:05

## 2025-06-11 RX ADMIN — ANORECTAL OINTMENT: 15.7; .44; 24; 20.6 OINTMENT TOPICAL at 08:05

## 2025-06-11 RX ADMIN — MICONAZOLE NITRATE: 20 POWDER TOPICAL at 20:34

## 2025-06-11 RX ADMIN — Medication 9 MG: at 20:33

## 2025-06-11 RX ADMIN — POTASSIUM CHLORIDE 20 MEQ: 29.8 INJECTION, SOLUTION INTRAVENOUS at 07:57

## 2025-06-11 RX ADMIN — POTASSIUM CHLORIDE 20 MEQ: 29.8 INJECTION, SOLUTION INTRAVENOUS at 09:32

## 2025-06-11 RX ADMIN — ANORECTAL OINTMENT: 15.7; .44; 24; 20.6 OINTMENT TOPICAL at 20:34

## 2025-06-11 RX ADMIN — INSULIN GLARGINE 10 UNITS: 100 INJECTION, SOLUTION SUBCUTANEOUS at 20:34

## 2025-06-11 RX ADMIN — METRONIDAZOLE 500 MG: 500 INJECTION, SOLUTION INTRAVENOUS at 05:21

## 2025-06-11 RX ADMIN — SERTRALINE 100 MG: 100 TABLET, FILM COATED ORAL at 08:05

## 2025-06-11 RX ADMIN — MIRTAZAPINE 15 MG: 15 TABLET, FILM COATED ORAL at 20:34

## 2025-06-11 RX ADMIN — HYDROCORTISONE SODIUM SUCCINATE 50 MG: 100 INJECTION INTRAMUSCULAR; INTRAVENOUS at 00:56

## 2025-06-11 RX ADMIN — APIXABAN 2.5 MG: 2.5 TABLET, FILM COATED ORAL at 08:05

## 2025-06-11 RX ADMIN — ONDANSETRON 4 MG: 2 INJECTION, SOLUTION INTRAMUSCULAR; INTRAVENOUS at 21:57

## 2025-06-11 RX ADMIN — CEFDINIR 300 MG: 300 CAPSULE ORAL at 11:36

## 2025-06-11 RX ADMIN — SODIUM CHLORIDE, PRESERVATIVE FREE 10 ML: 5 INJECTION INTRAVENOUS at 08:07

## 2025-06-11 RX ADMIN — APIXABAN 2.5 MG: 2.5 TABLET, FILM COATED ORAL at 20:34

## 2025-06-11 RX ADMIN — METRONIDAZOLE 500 MG: 500 TABLET ORAL at 14:27

## 2025-06-11 RX ADMIN — MICONAZOLE NITRATE: 20 POWDER TOPICAL at 08:04

## 2025-06-11 RX ADMIN — HYDROCORTISONE SODIUM SUCCINATE 50 MG: 100 INJECTION INTRAMUSCULAR; INTRAVENOUS at 14:27

## 2025-06-11 RX ADMIN — METRONIDAZOLE 500 MG: 500 TABLET ORAL at 21:32

## 2025-06-11 RX ADMIN — SODIUM CHLORIDE, PRESERVATIVE FREE 20 MG: 5 INJECTION INTRAVENOUS at 08:05

## 2025-06-11 RX ADMIN — BACLOFEN 10 MG: 10 TABLET ORAL at 20:34

## 2025-06-11 RX ADMIN — FAMOTIDINE 20 MG: 20 TABLET, FILM COATED ORAL at 20:33

## 2025-06-11 RX ADMIN — CEFDINIR 300 MG: 300 CAPSULE ORAL at 20:33

## 2025-06-11 RX ADMIN — POTASSIUM CHLORIDE 20 MEQ: 29.8 INJECTION, SOLUTION INTRAVENOUS at 06:38

## 2025-06-11 RX ADMIN — INSULIN LISPRO 1 UNITS: 100 INJECTION, SOLUTION INTRAVENOUS; SUBCUTANEOUS at 20:34

## 2025-06-11 NOTE — PATIENT CARE CONFERENCE
Intensive Care Daily Quality Rounding Checklist        ICU Team Members: bedside nurse, charge nurse      ICU Day # IM      SOFA Score: 2     Intubation Date: N/A     Ventilator Day #: N/A     Central Line Insertion Date: Kandace 8                                                    Day #:4                                                    Indication: will remove today      Arterial Line Insertion Date: Kandace 9                             Day #: NUMBER: 3     Temporary Hemodialysis Catheter Insertion Date: N/A                             Day # N/A     DVT Prophylaxis: Eliquis     GI Prophylaxis: Pepcid     Muhammad Catheter Insertion Date: Suprapubic catheter changed in ICU 6/8/2025                                        Day #: Chronic                             Indications: Chronic - neurogenic bladder                             Continued need (if yes, reason documented and discussed with physician): Chronic     Skin Issues/ Wounds and ordered treatment discussed on rounds: Yes, wound care consulted     Goals/ Plans for the Day: Monitor labs and vitals, treat/replace as needed.      Reviewed plan and goals for day with patient and/or representative: No

## 2025-06-12 LAB
ALBUMIN SERPL-MCNC: 3.1 G/DL (ref 3.5–5.2)
ALP SERPL-CCNC: 102 U/L (ref 35–104)
ALT SERPL-CCNC: 13 U/L (ref 0–32)
ANION GAP SERPL CALCULATED.3IONS-SCNC: 14 MMOL/L (ref 7–16)
AST SERPL-CCNC: 21 U/L (ref 0–31)
BASOPHILS # BLD: 0.03 K/UL (ref 0–0.2)
BASOPHILS NFR BLD: 0 % (ref 0–2)
BILIRUB SERPL-MCNC: 0.2 MG/DL (ref 0–1.2)
BUN SERPL-MCNC: 8 MG/DL (ref 6–23)
CALCIUM SERPL-MCNC: 8.2 MG/DL (ref 8.6–10.2)
CHLORIDE SERPL-SCNC: 104 MMOL/L (ref 98–107)
CO2 SERPL-SCNC: 24 MMOL/L (ref 22–29)
CREAT SERPL-MCNC: 0.3 MG/DL (ref 0.5–1)
EOSINOPHIL # BLD: 0.24 K/UL (ref 0.05–0.5)
EOSINOPHILS RELATIVE PERCENT: 2 % (ref 0–6)
ERYTHROCYTE [DISTWIDTH] IN BLOOD BY AUTOMATED COUNT: 21.9 % (ref 11.5–15)
GFR, ESTIMATED: >90 ML/MIN/1.73M2
GLUCOSE BLD-MCNC: 122 MG/DL (ref 74–99)
GLUCOSE BLD-MCNC: 200 MG/DL (ref 74–99)
GLUCOSE SERPL-MCNC: 118 MG/DL (ref 74–99)
HCT VFR BLD AUTO: 31 % (ref 34–48)
HGB BLD-MCNC: 9.1 G/DL (ref 11.5–15.5)
IMM GRANULOCYTES # BLD AUTO: 0.06 K/UL (ref 0–0.58)
IMM GRANULOCYTES NFR BLD: 1 % (ref 0–5)
LYMPHOCYTES NFR BLD: 0.98 K/UL (ref 1.5–4)
LYMPHOCYTES RELATIVE PERCENT: 9 % (ref 20–42)
MAGNESIUM SERPL-MCNC: 1.6 MG/DL (ref 1.6–2.6)
MCH RBC QN AUTO: 25.3 PG (ref 26–35)
MCHC RBC AUTO-ENTMCNC: 29.4 G/DL (ref 32–34.5)
MCV RBC AUTO: 86.4 FL (ref 80–99.9)
MONOCYTES NFR BLD: 0.42 K/UL (ref 0.1–0.95)
MONOCYTES NFR BLD: 4 % (ref 2–12)
NEUTROPHILS NFR BLD: 85 % (ref 43–80)
NEUTS SEG NFR BLD: 9.84 K/UL (ref 1.8–7.3)
PHOSPHATE SERPL-MCNC: 2.7 MG/DL (ref 2.5–4.5)
PLATELET # BLD AUTO: 210 K/UL (ref 130–450)
PMV BLD AUTO: 10.5 FL (ref 7–12)
POTASSIUM SERPL-SCNC: 3.3 MMOL/L (ref 3.5–5)
PROT SERPL-MCNC: 6.1 G/DL (ref 6.4–8.3)
RBC # BLD AUTO: 3.59 M/UL (ref 3.5–5.5)
RBC # BLD: ABNORMAL 10*6/UL
SEND OUT REPORT: NORMAL
SODIUM SERPL-SCNC: 142 MMOL/L (ref 132–146)
TEST NAME: NORMAL
WBC OTHER # BLD: 11.6 K/UL (ref 4.5–11.5)

## 2025-06-12 PROCEDURE — 6370000000 HC RX 637 (ALT 250 FOR IP): Performed by: INTERNAL MEDICINE

## 2025-06-12 PROCEDURE — 2580000003 HC RX 258: Performed by: INTERNAL MEDICINE

## 2025-06-12 PROCEDURE — 2500000003 HC RX 250 WO HCPCS: Performed by: INTERNAL MEDICINE

## 2025-06-12 PROCEDURE — 6360000002 HC RX W HCPCS: Performed by: INTERNAL MEDICINE

## 2025-06-12 PROCEDURE — 6370000000 HC RX 637 (ALT 250 FOR IP): Performed by: NURSE PRACTITIONER

## 2025-06-12 PROCEDURE — 92526 ORAL FUNCTION THERAPY: CPT

## 2025-06-12 PROCEDURE — 82962 GLUCOSE BLOOD TEST: CPT

## 2025-06-12 PROCEDURE — 6360000002 HC RX W HCPCS: Performed by: STUDENT IN AN ORGANIZED HEALTH CARE EDUCATION/TRAINING PROGRAM

## 2025-06-12 PROCEDURE — 6370000000 HC RX 637 (ALT 250 FOR IP): Performed by: SPECIALIST

## 2025-06-12 PROCEDURE — 84100 ASSAY OF PHOSPHORUS: CPT

## 2025-06-12 PROCEDURE — 2060000000 HC ICU INTERMEDIATE R&B

## 2025-06-12 PROCEDURE — 85025 COMPLETE CBC W/AUTO DIFF WBC: CPT

## 2025-06-12 PROCEDURE — 93005 ELECTROCARDIOGRAM TRACING: CPT | Performed by: STUDENT IN AN ORGANIZED HEALTH CARE EDUCATION/TRAINING PROGRAM

## 2025-06-12 PROCEDURE — 80053 COMPREHEN METABOLIC PANEL: CPT

## 2025-06-12 PROCEDURE — 83735 ASSAY OF MAGNESIUM: CPT

## 2025-06-12 RX ORDER — ONDANSETRON 4 MG/1
4 TABLET, ORALLY DISINTEGRATING ORAL EVERY 6 HOURS PRN
Status: DISCONTINUED | OUTPATIENT
Start: 2025-06-12 | End: 2025-06-13 | Stop reason: HOSPADM

## 2025-06-12 RX ORDER — ONDANSETRON 2 MG/ML
4 INJECTION INTRAMUSCULAR; INTRAVENOUS EVERY 6 HOURS PRN
Status: DISCONTINUED | OUTPATIENT
Start: 2025-06-12 | End: 2025-06-13 | Stop reason: HOSPADM

## 2025-06-12 RX ADMIN — METRONIDAZOLE 500 MG: 500 TABLET ORAL at 22:58

## 2025-06-12 RX ADMIN — MIRTAZAPINE 15 MG: 15 TABLET, FILM COATED ORAL at 19:53

## 2025-06-12 RX ADMIN — ANORECTAL OINTMENT: 15.7; .44; 24; 20.6 OINTMENT TOPICAL at 08:26

## 2025-06-12 RX ADMIN — MAGNESIUM SULFATE HEPTAHYDRATE 2000 MG: 40 INJECTION, SOLUTION INTRAVENOUS at 05:22

## 2025-06-12 RX ADMIN — SODIUM PHOSPHATE, MONOBASIC, MONOHYDRATE 15 MMOL: 276; 142 INJECTION, SOLUTION INTRAVENOUS at 05:44

## 2025-06-12 RX ADMIN — ONDANSETRON 4 MG: 2 INJECTION, SOLUTION INTRAMUSCULAR; INTRAVENOUS at 06:12

## 2025-06-12 RX ADMIN — FAMOTIDINE 20 MG: 20 TABLET, FILM COATED ORAL at 19:53

## 2025-06-12 RX ADMIN — APIXABAN 2.5 MG: 2.5 TABLET, FILM COATED ORAL at 19:53

## 2025-06-12 RX ADMIN — HYDROCORTISONE SODIUM SUCCINATE 50 MG: 100 INJECTION INTRAMUSCULAR; INTRAVENOUS at 01:13

## 2025-06-12 RX ADMIN — POTASSIUM CHLORIDE 20 MEQ: 29.8 INJECTION, SOLUTION INTRAVENOUS at 05:22

## 2025-06-12 RX ADMIN — ONDANSETRON 4 MG: 2 INJECTION, SOLUTION INTRAMUSCULAR; INTRAVENOUS at 16:24

## 2025-06-12 RX ADMIN — METRONIDAZOLE 500 MG: 500 TABLET ORAL at 06:04

## 2025-06-12 RX ADMIN — MICONAZOLE NITRATE: 20 POWDER TOPICAL at 08:26

## 2025-06-12 RX ADMIN — CEFDINIR 300 MG: 300 CAPSULE ORAL at 19:53

## 2025-06-12 RX ADMIN — POTASSIUM CHLORIDE 20 MEQ: 29.8 INJECTION, SOLUTION INTRAVENOUS at 06:59

## 2025-06-12 RX ADMIN — Medication 9 MG: at 19:53

## 2025-06-12 RX ADMIN — APIXABAN 2.5 MG: 2.5 TABLET, FILM COATED ORAL at 08:25

## 2025-06-12 RX ADMIN — METRONIDAZOLE 500 MG: 500 TABLET ORAL at 14:29

## 2025-06-12 RX ADMIN — MICONAZOLE NITRATE: 20 POWDER TOPICAL at 19:52

## 2025-06-12 RX ADMIN — SODIUM CHLORIDE, PRESERVATIVE FREE 10 ML: 5 INJECTION INTRAVENOUS at 19:55

## 2025-06-12 RX ADMIN — BACLOFEN 10 MG: 10 TABLET ORAL at 19:53

## 2025-06-12 RX ADMIN — INSULIN LISPRO 1 UNITS: 100 INJECTION, SOLUTION INTRAVENOUS; SUBCUTANEOUS at 22:58

## 2025-06-12 RX ADMIN — HYDROCORTISONE SODIUM SUCCINATE 50 MG: 100 INJECTION, POWDER, FOR SOLUTION INTRAMUSCULAR; INTRAVENOUS at 14:39

## 2025-06-12 RX ADMIN — BACLOFEN 10 MG: 10 TABLET ORAL at 08:25

## 2025-06-12 RX ADMIN — BACLOFEN 10 MG: 10 TABLET ORAL at 14:29

## 2025-06-12 RX ADMIN — CEFDINIR 300 MG: 300 CAPSULE ORAL at 08:37

## 2025-06-12 RX ADMIN — FAMOTIDINE 20 MG: 20 TABLET, FILM COATED ORAL at 08:25

## 2025-06-12 RX ADMIN — SERTRALINE 100 MG: 100 TABLET, FILM COATED ORAL at 08:25

## 2025-06-12 ASSESSMENT — PAIN SCALES - GENERAL
PAINLEVEL_OUTOF10: 0
PAINLEVEL_OUTOF10: 0
PAINLEVEL_OUTOF10: 6

## 2025-06-12 ASSESSMENT — PAIN DESCRIPTION - LOCATION: LOCATION: GENERALIZED

## 2025-06-12 ASSESSMENT — PAIN - FUNCTIONAL ASSESSMENT: PAIN_FUNCTIONAL_ASSESSMENT: PREVENTS OR INTERFERES SOME ACTIVE ACTIVITIES AND ADLS

## 2025-06-12 ASSESSMENT — PAIN DESCRIPTION - DESCRIPTORS: DESCRIPTORS: DISCOMFORT

## 2025-06-12 ASSESSMENT — PAIN DESCRIPTION - ORIENTATION: ORIENTATION: RIGHT;LEFT

## 2025-06-12 NOTE — CARE COORDINATION
Social Work:    Per RN. Possible return to [facility] today vs tomorrow. [facility] (N-17, ambulance completed)    Electronically signed by COLEMAN Cleary on 6/12/2025 at 2:27 PM

## 2025-06-12 NOTE — INTERDISCIPLINARY ROUNDS
Intensive Care Daily Quality Rounding Checklist        ICU Team Members: bedside nurse, charge nurse      ICU Day # IM      SOFA Score: 2     Intubation Date: N/A     Ventilator Day #: N/A     Central Line Insertion Date: Kandace 8                                                    Day #:5                                                    Indication: will remove today      Arterial Line Insertion Date: Kandace 9                             Day #: NUMBER: 3     Temporary Hemodialysis Catheter Insertion Date: N/A                             Day # N/A     DVT Prophylaxis: Eliquis     GI Prophylaxis: Pepcid     Muhammad Catheter Insertion Date: Suprapubic catheter changed in ICU 6/8/2025                                        Day #: Chronic                             Indications: Chronic - neurogenic bladder                             Continued need (if yes, reason documented and discussed with physician): Chronic     Skin Issues/ Wounds and ordered treatment discussed on rounds: Yes, wound care consulted     Goals/ Plans for the Day: Monitor labs and vitals, treat/replace as needed.      Reviewed plan and goals for day with patient and/or representative: No              Revision History

## 2025-06-13 VITALS
DIASTOLIC BLOOD PRESSURE: 84 MMHG | RESPIRATION RATE: 18 BRPM | BODY MASS INDEX: 21.83 KG/M2 | SYSTOLIC BLOOD PRESSURE: 163 MMHG | WEIGHT: 139.1 LBS | HEART RATE: 102 BPM | OXYGEN SATURATION: 100 % | HEIGHT: 67 IN | TEMPERATURE: 97.5 F

## 2025-06-13 LAB
ALBUMIN SERPL-MCNC: 3.2 G/DL (ref 3.5–5.2)
ALP SERPL-CCNC: 99 U/L (ref 35–104)
ALT SERPL-CCNC: 12 U/L (ref 0–32)
ANION GAP SERPL CALCULATED.3IONS-SCNC: 13 MMOL/L (ref 7–16)
AST SERPL-CCNC: 17 U/L (ref 0–31)
BASOPHILS # BLD: 0 K/UL (ref 0–0.2)
BASOPHILS NFR BLD: 0 % (ref 0–2)
BILIRUB SERPL-MCNC: 0.3 MG/DL (ref 0–1.2)
BUN SERPL-MCNC: 9 MG/DL (ref 6–23)
CALCIUM SERPL-MCNC: 8.1 MG/DL (ref 8.6–10.2)
CHLORIDE SERPL-SCNC: 101 MMOL/L (ref 98–107)
CO2 SERPL-SCNC: 24 MMOL/L (ref 22–29)
CREAT SERPL-MCNC: 0.3 MG/DL (ref 0.5–1)
EKG ATRIAL RATE: 104 BPM
EKG P AXIS: 52 DEGREES
EKG P-R INTERVAL: 172 MS
EKG Q-T INTERVAL: 356 MS
EKG QRS DURATION: 98 MS
EKG QTC CALCULATION (BAZETT): 468 MS
EKG R AXIS: 1 DEGREES
EKG T AXIS: 74 DEGREES
EKG VENTRICULAR RATE: 104 BPM
EOSINOPHIL # BLD: 0.18 K/UL (ref 0.05–0.5)
EOSINOPHILS RELATIVE PERCENT: 2 % (ref 0–6)
ERYTHROCYTE [DISTWIDTH] IN BLOOD BY AUTOMATED COUNT: 21.7 % (ref 11.5–15)
GFR, ESTIMATED: >90 ML/MIN/1.73M2
GLUCOSE BLD-MCNC: 216 MG/DL (ref 74–99)
GLUCOSE BLD-MCNC: 347 MG/DL (ref 74–99)
GLUCOSE BLD-MCNC: 371 MG/DL (ref 74–99)
GLUCOSE SERPL-MCNC: 187 MG/DL (ref 74–99)
HCT VFR BLD AUTO: 32.5 % (ref 34–48)
HGB BLD-MCNC: 9.8 G/DL (ref 11.5–15.5)
LYMPHOCYTES NFR BLD: 1.24 K/UL (ref 1.5–4)
LYMPHOCYTES RELATIVE PERCENT: 12 % (ref 20–42)
MAGNESIUM SERPL-MCNC: 1.7 MG/DL (ref 1.6–2.6)
MCH RBC QN AUTO: 25.6 PG (ref 26–35)
MCHC RBC AUTO-ENTMCNC: 30.2 G/DL (ref 32–34.5)
MCV RBC AUTO: 84.9 FL (ref 80–99.9)
MICROORGANISM SPEC CULT: ABNORMAL
MONOCYTES NFR BLD: 0.53 K/UL (ref 0.1–0.95)
MONOCYTES NFR BLD: 5 % (ref 2–12)
NEUTROPHILS NFR BLD: 81 % (ref 43–80)
NEUTS SEG NFR BLD: 8.15 K/UL (ref 1.8–7.3)
PATH REV BLD -IMP: NORMAL
PHOSPHATE SERPL-MCNC: 2.6 MG/DL (ref 2.5–4.5)
PLATELET # BLD AUTO: 226 K/UL (ref 130–450)
PMV BLD AUTO: 10.1 FL (ref 7–12)
POTASSIUM SERPL-SCNC: 3.2 MMOL/L (ref 3.5–5)
PROT SERPL-MCNC: 6.2 G/DL (ref 6.4–8.3)
RBC # BLD AUTO: 3.83 M/UL (ref 3.5–5.5)
RBC # BLD: ABNORMAL 10*6/UL
SERVICE CMNT-IMP: ABNORMAL
SODIUM SERPL-SCNC: 138 MMOL/L (ref 132–146)
SPECIMEN DESCRIPTION: ABNORMAL
WBC OTHER # BLD: 10.1 K/UL (ref 4.5–11.5)

## 2025-06-13 PROCEDURE — 6370000000 HC RX 637 (ALT 250 FOR IP): Performed by: INTERNAL MEDICINE

## 2025-06-13 PROCEDURE — 84100 ASSAY OF PHOSPHORUS: CPT

## 2025-06-13 PROCEDURE — 6360000002 HC RX W HCPCS: Performed by: INTERNAL MEDICINE

## 2025-06-13 PROCEDURE — 6370000000 HC RX 637 (ALT 250 FOR IP): Performed by: NURSE PRACTITIONER

## 2025-06-13 PROCEDURE — 82962 GLUCOSE BLOOD TEST: CPT

## 2025-06-13 PROCEDURE — 2500000003 HC RX 250 WO HCPCS: Performed by: INTERNAL MEDICINE

## 2025-06-13 PROCEDURE — 80053 COMPREHEN METABOLIC PANEL: CPT

## 2025-06-13 PROCEDURE — 85025 COMPLETE CBC W/AUTO DIFF WBC: CPT

## 2025-06-13 PROCEDURE — 6370000000 HC RX 637 (ALT 250 FOR IP): Performed by: SPECIALIST

## 2025-06-13 PROCEDURE — 83735 ASSAY OF MAGNESIUM: CPT

## 2025-06-13 RX ORDER — CEFDINIR 300 MG/1
300 CAPSULE ORAL EVERY 12 HOURS SCHEDULED
DISCHARGE
Start: 2025-06-13 | End: 2025-06-17

## 2025-06-13 RX ADMIN — ANORECTAL OINTMENT: 15.7; .44; 24; 20.6 OINTMENT TOPICAL at 11:30

## 2025-06-13 RX ADMIN — INSULIN LISPRO 4 UNITS: 100 INJECTION, SOLUTION INTRAVENOUS; SUBCUTANEOUS at 15:45

## 2025-06-13 RX ADMIN — CEFDINIR 300 MG: 300 CAPSULE ORAL at 08:40

## 2025-06-13 RX ADMIN — MICONAZOLE NITRATE: 20 POWDER TOPICAL at 08:44

## 2025-06-13 RX ADMIN — SODIUM CHLORIDE, PRESERVATIVE FREE 10 ML: 5 INJECTION INTRAVENOUS at 08:44

## 2025-06-13 RX ADMIN — BACLOFEN 10 MG: 10 TABLET ORAL at 08:40

## 2025-06-13 RX ADMIN — INSULIN LISPRO 1 UNITS: 100 INJECTION, SOLUTION INTRAVENOUS; SUBCUTANEOUS at 08:44

## 2025-06-13 RX ADMIN — FAMOTIDINE 20 MG: 20 TABLET, FILM COATED ORAL at 08:40

## 2025-06-13 RX ADMIN — HYDROCORTISONE SODIUM SUCCINATE 50 MG: 100 INJECTION, POWDER, FOR SOLUTION INTRAMUSCULAR; INTRAVENOUS at 08:44

## 2025-06-13 RX ADMIN — SERTRALINE 100 MG: 100 TABLET, FILM COATED ORAL at 08:40

## 2025-06-13 RX ADMIN — INSULIN LISPRO 4 UNITS: 100 INJECTION, SOLUTION INTRAVENOUS; SUBCUTANEOUS at 11:28

## 2025-06-13 RX ADMIN — METRONIDAZOLE 500 MG: 500 TABLET ORAL at 07:36

## 2025-06-13 RX ADMIN — BACLOFEN 10 MG: 10 TABLET ORAL at 15:41

## 2025-06-13 RX ADMIN — APIXABAN 2.5 MG: 2.5 TABLET, FILM COATED ORAL at 08:40

## 2025-06-13 NOTE — CARE COORDINATION
Await ID clearance for discharge. Pt can return to [facility]. CM placed pt in will call through [facility].   SIVA McgrawN, RN  [facility] Case Management  [phone]      CM notified facility of  time between 6 pm - 8 pm - nursing notified family.

## 2025-06-13 NOTE — DISCHARGE SUMMARY
Magnesium Glycinate 100 MG Caps     melatonin 3 MG Tabs tablet     metFORMIN 850 MG tablet  Commonly known as: GLUCOPHAGE     Myrbetriq 50 MG Tb24  Generic drug: mirabegron     oxyBUTYnin 5 MG extended release tablet  Commonly known as: DITROPAN-XL     potassium chloride 10 MEQ extended release capsule  Commonly known as: MICRO-K     sennosides-docusate sodium 8.6-50 MG tablet  Commonly known as: SENOKOT-S     sertraline 100 MG tablet  Commonly known as: ZOLOFT     traMADol 50 MG tablet  Commonly known as: ULTRAM     TYSABRI IV     vitamin D3 125 MCG (5000 UT) Tabs tablet  Commonly known as: CHOLECALCIFEROL     Zantac 150 Maximum Strength 150 MG tablet  Generic drug: raNITIdine            Activity: activity as tolerated  Diet: regular diet    Pt has been advised to:    Follow-up with KYMBERLY Stahl MD in 1 week.  Follow-up with consultants as recommended by them    Note that over 35 minutes was spent in preparing discharge papers, discussing discharge with patient, medication review, etc.    Signed:  Fernie Read DO  6/13/2025  8:33 AM    Note this report, in part in full, may have been transcribed using voice recognition software.  Considerable effort was made to ensure accuracy, however inadvertent computerized transcription errors may occur.  Please excuse any transcription error grammatical or spelling errors that may have escaped my editorial review and correction

## 2025-06-13 NOTE — PROGRESS NOTES
[facility]   Progress note      Subjective:    The patient is awake and alert.    No acute events overnight.    Denies chest pain, angina, SOB. C/o right hand being cold    Objective:    BP (!) 141/71   Pulse (!) 101   Temp 97.6 °F (36.4 °C) (Axillary)   Resp 18   Ht 1.702 m (5' 7\")   Wt 60.8 kg (134 lb 0.6 oz)   SpO2 97%   BMI 20.99 kg/m²     In: -   Out: 1250   In: -   Out: 1250 [Urine:800]    Physical Exam  Vitals reviewed.   Constitutional:       General: She is not in acute distress.     Appearance: Normal appearance. She is normal weight. She is not ill-appearing.   HENT:      Mouth/Throat:      Mouth: Mucous membranes are moist.   Cardiovascular:      Rate and Rhythm: Normal rate and regular rhythm.      Pulses: Normal pulses.      Heart sounds: No murmur heard.  Pulmonary:      Effort: Pulmonary effort is normal. No respiratory distress.      Breath sounds: Normal breath sounds. No wheezing or rales.   Abdominal:      General: Abdomen is flat. Bowel sounds are normal. There is no distension.      Palpations: Abdomen is soft.      Tenderness: There is no abdominal tenderness. There is no guarding.   Musculoskeletal:      Right lower leg: No edema.      Left lower leg: No edema.   Neurological:      General: No focal deficit present.      Mental Status: She is alert and oriented to person, place, and time. Mental status is at baseline.   Psychiatric:         Mood and Affect: Mood normal.         Behavior: Behavior normal.            Recent Labs     06/10/25  0436 06/11/25  0520 06/12/25  0310   WBC 21.6* 14.1* 11.6*   HGB 9.8* 9.0* 9.1*   HCT 30.0* 28.2* 31.0*    189 210       Recent Labs     06/10/25  1035 06/10/25  2020 06/11/25  0520 06/11/25  1430 06/12/25  0310     --  146  --  142   K 2.6*   < > 3.0* 3.9 3.3*   CL 93*  --  103  --  104   CO2 34*  --  29  --  24   BUN 7  --  5*  --  8   CREATININE 0.3*  --  0.3*  --  0.3*   CALCIUM 7.5*  --  7.8*  --  8.2*    < > = 
    Notification of IV to PO conversion:    This patient's order for famotidine IV has been changed to famotidine PO as approved by the [facility] INTRAVENOUS TO ORAL Policy.    If the patient should become strict NPO while on this therapy, contact the prescriber for further orders.    Mike Orozco RPH  6/11/2025  11:50 AM    
   [facility]  [facility]  Progress Note    SUBJECTIVE:  No chief complaint on file.    Patient is resting in bed, reports she is doing well  FMS is to be removed today - watery stool present  She states she is tolerating antibiotics  No fevers overnight    Review of systems:  As stated above in the chief complaint, otherwise negative.    Medications:  Scheduled Meds:   metroNIDAZOLE  500 mg Oral 3 times per day    cefdinir  300 mg Oral 2 times per day    famotidine  20 mg Oral BID    insulin glargine  10 Units SubCUTAneous Nightly    menthol-zinc oxide   Topical BID    miconazole   Topical BID    apixaban  2.5 mg Oral BID    mirtazapine  15 mg Oral Nightly    sertraline  100 mg Oral Daily    fentaNYL  1 patch TransDERmal Q72H    melatonin  9 mg Oral Nightly    [Held by provider] lisinopril  5 mg Oral Daily    insulin lispro  0-4 Units SubCUTAneous 4x Daily AC & HS    sodium chloride flush  5-40 mL IntraVENous 2 times per day    baclofen  10 mg Oral TID     Continuous Infusions:   sodium chloride 20 mL/hr at 06/10/25 1855    dextrose       PRN Meds:ondansetron **OR** ondansetron, menthol-zinc oxide **AND** menthol-zinc oxide, fentanNYL, traMADol, polyethylene glycol, acetaminophen **OR** acetaminophen, sodium chloride flush, sodium chloride, potassium chloride **OR** potassium chloride, magnesium sulfate, sodium phosphate 15 mmol in sodium chloride 0.9 % 250 mL IVPB, glucose, dextrose bolus **OR** dextrose bolus, glucagon (rDNA), dextrose    OBJECTIVE:  BP (!) 150/76   Pulse 89   Temp 97.2 °F (36.2 °C) (Axillary)   Resp 20   Ht 1.702 m (5' 7\")   Wt 63.1 kg (139 lb 1.6 oz)   SpO2 98%   BMI 21.79 kg/m²   Temp  Av.4 °F (36.3 °C)  Min: 96.8 °F (36 °C)  Max: 98 °F (36.7 °C)  Constitutional: The patient is resting in bed.  She is awake and in no distress.  Contracted.  Skin: Warm and dry. No rashes were noted.   HEENT: Round and reactive pupils.  Moist mucous membranes.  No 
   [facility]  [facility]  Progress Note    SUBJECTIVE:  No chief complaint on file.    The patient is feeling slightly better.  No abdominal pain.  No nausea or vomiting.  No fever.    Review of systems:  As stated above in the chief complaint, otherwise negative.    Medications:  Scheduled Meds:   hydrocortisone sodium succinate PF  50 mg IntraVENous Q12H    Followed by    [START ON 2025] hydrocortisone sodium succinate PF  50 mg IntraVENous Daily    insulin glargine  10 Units SubCUTAneous Nightly    menthol-zinc oxide   Topical BID    miconazole   Topical BID    apixaban  2.5 mg Oral BID    mirtazapine  15 mg Oral Nightly    sertraline  100 mg Oral Daily    fentaNYL  1 patch TransDERmal Q72H    melatonin  9 mg Oral Nightly    [Held by provider] lisinopril  5 mg Oral Daily    insulin lispro  0-4 Units SubCUTAneous 4x Daily AC & HS    sodium chloride flush  5-40 mL IntraVENous 2 times per day    metroNIDAZOLE  500 mg IntraVENous Q8H    cefTRIAXone (ROCEPHIN) IV  2,000 mg IntraVENous Q24H    baclofen  10 mg Oral TID    famotidine (PEPCID) injection  20 mg IntraVENous BID     Continuous Infusions:   sodium chloride 20 mL/hr at 06/10/25 1855    dextrose       PRN Meds:menthol-zinc oxide **AND** menthol-zinc oxide, fentanNYL, traMADol, polyethylene glycol, acetaminophen **OR** acetaminophen, sodium chloride flush, sodium chloride, potassium chloride **OR** potassium chloride, magnesium sulfate, ondansetron **OR** ondansetron, sodium phosphate 15 mmol in sodium chloride 0.9 % 250 mL IVPB, glucose, dextrose bolus **OR** dextrose bolus, glucagon (rDNA), dextrose    OBJECTIVE:  BP (!) 159/80   Pulse (!) 107   Temp 97.7 °F (36.5 °C) (Bladder)   Resp 15   Ht 1.702 m (5' 7\")   Wt 60.8 kg (134 lb 0.6 oz)   SpO2 97%   BMI 20.99 kg/m²   Temp  Av.9 °F (37.2 °C)  Min: 97.7 °F (36.5 °C)  Max: 99.9 °F (37.7 °C)  Constitutional: The patient is in bed in the ICU.  She is awake and alert.  
  [facility] Quality Flow/Interdisciplinary Rounds Progress Note        Quality Flow Rounds held on June 13, 2025    Disciplines Attending:  Bedside Nurse, , , and Nursing Unit Leadership    Merissa Durbin was admitted on 6/8/2025  9:42 PM    Anticipated Discharge Date:  Expected Discharge Date: 06/14/25    Disposition:    Yanick Score:  Yanick Scale Score: 13    [facility] RISK OF UNPLANNED READMISSION 2.0             16.9 Total Score        Discussed patient goal for the day, patient clinical progression, and barriers to discharge.  The following Goal(s) of the Day/Commitment(s) have been identified:  check ID and GS for d/c po abx, steroids, done      Remedios Mujica RN  June 13, 2025        
  Physician Progress Note      PATIENT:               LORENA ZHENG  CSN #:                  [id number]  :                       1960  ADMIT DATE:       2025 9:42 PM  DISCH DATE:  RESPONDING  PROVIDER #:        Zaheer Taylor MD          QUERY TEXT:    UTI is documented in the medical record in IM pnotes  by Dr. Taylor.    Please clarify the relationship, if any, with the suprapubic catheter:    The clinical indicators include:  -admitted with septic shock from colitis (H/P  Dr. Taylor)  -pnotes \"treating for colitis and now proteus UTI\" (Im pnotes  Dr. Taylor)  -hx of suprapubic cath (Critical Care consult note  Edelmira WILLOUGHBY)  -Rocephin IV (Orders )  Options provided:  -- UTI related to suprapubic catheter  -- UTI not related to suprapubic catheter  -- Other - I will add my own diagnosis  -- Disagree - Not applicable / Not valid  -- Disagree - Clinically unable to determine / Unknown  -- Refer to Clinical Documentation Reviewer    PROVIDER RESPONSE TEXT:    UTI is related to suprapubic catheter.    Query created by: Samir Peralta on 2025 1:44 PM      Electronically signed by:  Zaheer Taylor MD 2025 1:48 PM          
  Physician Progress Note      PATIENT:               LORENA ZHENG  CSN #:                  [id number]  :                       1960  ADMIT DATE:       2025 9:42 PM  DISCH DATE:  RESPONDING  PROVIDER #:        Zaheer Taylor MD          QUERY TEXT:    Acute Kidney Injury is documented in the medical record in IM pnotes 6/10 by   Dr. Mayfield  Please provide additional clinical indicators supportive of your   documentation. Or please document if the diagnosis of TRIP has been ruled out   after study.    The clinical indicators include:  -admitted with septic shock from colitis (H/P  Dr. Taylor)  -crea 0.5 (Lab ), crea 0.4 (Lab ), crea 0.4 (Lab 6/10), crea 0.3 (Lab   )  -LR @ 125 ml/hr (Orders ) serial crea levels (Lab -)  Options provided:  -- Acute kidney injury evidenced by, Please document evidence as well as a   numerical baseline creatinine, if known.  -- Acute kidney injury ruled out after study  -- Other - I will add my own diagnosis  -- Disagree - Not applicable / Not valid  -- Disagree - Clinically unable to determine / Unknown  -- Refer to Clinical Documentation Reviewer    PROVIDER RESPONSE TEXT:    This patient has an acute kidney injury as evidenced by read notes    Query created by: Samir Peralta on 2025 1:30 PM      Electronically signed by:  Zaheer Taylor MD 2025 1:36 PM          
 time at is at 6pm  
 time at is at 6pm    Spoke with family Key haley   
[facility]  Department of Internal Medicine   Internal Medicine Residency   MICU Progress Note    Patient:  Merissa Durbin 64 y.o. female  MRN: [id number]     Date of Service: 6/10/2025    Allergy: Latex, Adhesive tape, Iodine, Penicillins, Peroxide [hydrogen peroxide], Petroleum jelly [petrolatum], Sulfa antibiotics, Clindamycin/lincomycin, and Demerol hcl [meperidine]    Overnight Events: Weaned off of Levophed, sodium bicarb drip stopped    Subjective     Patient was seen and examined in bed this morning.  She was awake and alert.  She is still somewhat confused however her mentation is significantly improved from yesterday.  She is able to answer questions and follow commands appropriately.  She still complains of some pain to her abdomen.    She has been weaned off of Levophed and vasopressin.  Sodium bicarb drip was stopped.  Supplemental potassium given due to hypokalemia.    ROS: Negative except for as stated above    Objective     VS: BP (!) 100/46   Pulse 96   Temp 99.1 °F (37.3 °C) (Rectal)   Resp 16   Ht 1.702 m (5' 7\")   Wt 60.8 kg (134 lb 0.6 oz)   SpO2 92%   BMI 20.99 kg/m²   ABP (Arterial line BP): 100/46  ABP Mean (Arterial Line Mean): 69 mmHg    I & O - 24hr:   Intake/Output Summary (Last 24 hours) at 6/10/2025 0703  Last data filed at 6/10/2025 0654  Gross per 24 hour   Intake 8344.73 ml   Output 2075 ml   Net 6269.73 ml       Sedatives: None    Pressors: None    Oxygen: 2 L NC    ABG:     Lab Results   Component Value Date/Time    PH 7.342 06/09/2025 05:03 AM    PCO2 35.7 06/09/2025 05:03 AM    PO2 137.3 06/09/2025 05:03 AM    HCO3 18.9 06/09/2025 05:03 AM    BE -6.1 06/09/2025 05:03 AM    THB 11.1 06/09/2025 05:03 AM    O2SAT 98.8 06/09/2025 05:03 AM       Physical Exam:  General Appearance: Ill-appearing.  Neuro: Round symmetric pupil that react to light bilaterally.  Follows commands appropriately.  No movement of the lower extremities, contracture present.  Moves 
Chart reviewed and no family was at bedside.  Patient is off pressors and does have a transfer out of the ICU.  Will monitor peripherally for any palliative care needs.  
Chart reviewed, patient has transfer out of the ICU, and plan to return to ICF soon.  CODE STATUS established as a limited DNR CCA/DNI.  Patient is a DNR CC at the facility. There are no further PM needs at this time.  PM will now sign off.  If new PM needs arise, please re-consult.  Thank you.   
General Surgery   Daily Progress Note      Patient's Name/Date of Birth: Merissa Durbin / 1960    Date: June 11, 2025       Subjective: More alert today, denies any abdominal pain. Remains off pressors     Objective:  /64   Pulse 93   Temp 97.7 °F (36.5 °C)   Resp 14   Ht 1.702 m (5' 7\")   Wt 60.8 kg (134 lb 0.6 oz)   SpO2 96%   BMI 20.99 kg/m²   Labs:  Recent Labs     06/08/25 2220 06/09/25  0500 06/10/25  0436   WBC 57.1* 44.1* 21.6*   HGB 10.7* 10.3* 9.8*   HCT 37.1 34.5 30.0*     Recent Labs     06/09/25  2030 06/10/25  0436 06/10/25  1035 06/10/25  2020    139 140  --    K 3.0* 2.3* 2.6* 3.9   CL 86* 88* 93*  --    CO2 31* 36* 34*  --    BUN 10 8 7  --    CREATININE 0.4* 0.4* 0.3*  --      Recent Labs     06/08/25 2220 06/09/25  0500 06/10/25  0436   ALKPHOS 116* 124* 113*   ALT 15 15 15   AST 27 24 28   BILITOT 0.2 0.2 0.2         General appearance: NAD  Head: NCAT, PERRL, EOMI, pink conjunctiva  Neck: supple, no masses  Lungs: symmetric chest rise, no audible wheezes  Heart: warm throughout  Abdomen: soft, non-distended, non-tender to palpation throughout  Skin: no visible lesions  Extremities: extremities normal, atraumatic, no cyanosis or edema      Assessment/Plan:  Merissa Durbin is a 64 y.o. female with ischemic colitis and ELDA      - Tolerating clear liquids, ok for low fiber diet from gen surg but will defer to primary team   - Leukocytosis improving; hemoglobin stable   - Abx per ID  - Monitor abdominal exam  - Appreciate MICU recs; stable for downgrade     Lexx Rosado DO  General Surgery Resident    The patient was seen and examined and the chart was reviewed.  I agree with the assessment and plan.  Advance diet as tolerated to low residue.  Monitor clinical exam.  Justyn Julio MD      
General Surgery   Daily Progress Note      Patient's Name/Date of Birth: Merissa Durbin / 1960    Date: June 12, 2025       Subjective: No issues. Tolerating diet. No N/V. NO abdominal pain.     Objective:  /75   Pulse 90   Temp 97.5 °F (36.4 °C) (Rectal)   Resp 14   Ht 1.702 m (5' 7\")   Wt 60.8 kg (134 lb 0.6 oz)   SpO2 96%   BMI 20.99 kg/m²   Labs:  Recent Labs     06/10/25  0436 06/11/25  0520 06/12/25  0310   WBC 21.6* 14.1* 11.6*   HGB 9.8* 9.0* 9.1*   HCT 30.0* 28.2* 31.0*     Recent Labs     06/10/25  1035 06/10/25  2020 06/11/25  0520 06/11/25  1430 06/12/25  0310     --  146  --  142   K 2.6*   < > 3.0* 3.9 3.3*   CL 93*  --  103  --  104   CO2 34*  --  29  --  24   BUN 7  --  5*  --  8   CREATININE 0.3*  --  0.3*  --  0.3*    < > = values in this interval not displayed.     Recent Labs     06/10/25  0436 06/11/25  0520 06/12/25  0310   ALKPHOS 113* 104 102   ALT 15 14 13   AST 28 27 21   BILITOT 0.2 0.2 0.2         General appearance: NAD  Head: NCAT, PERRL, EOMI, pink conjunctiva  Neck: supple, no masses  Lungs: symmetric chest rise, no audible wheezes  Heart: warm throughout  Abdomen: soft, non-distended, non-tender to palpation throughout  Skin: no visible lesions  Extremities: extremities normal, atraumatic, no cyanosis or edema      Assessment/Plan:  Merissa Durbin is a 64 y.o. female with ischemic colitis and ELDA      -  ok for low fiber diet   - Abx per ID  - Monitor abdominal exam  - Appreciate MICU recs; stable for downgrade       Donn Pitt DO  General Surgery Resident    The patient was seen and examined and the chart was reviewed.  I agree with the assessment and plan.  Continue to monitor the patient's clinical status.  Ultimately, the patient will undergo a colonoscopy to further evaluate the findings.  Justyn Julio MD    
General Surgery   Daily Progress Note      Patient's Name/Date of Birth: Merissa Durbin / 1960    Date: June 13, 2025       Subjective:   No acute events overnight.  No abdominal pain.  No nausea or emesis. Pt had 350 cc of stool in FMS.     Objective:  BP (!) 141/66   Pulse 94   Temp 97 °F (36.1 °C) (Axillary)   Resp 18   Ht 1.702 m (5' 7\")   Wt 63.1 kg (139 lb 1.6 oz)   SpO2 99%   BMI 21.79 kg/m²   Labs:  Recent Labs     06/11/25  0520 06/12/25  0310 06/13/25  0325   WBC 14.1* 11.6* 10.1   HGB 9.0* 9.1* 9.8*   HCT 28.2* 31.0* 32.5*     Recent Labs     06/11/25  0520 06/11/25  1430 06/12/25  0310 06/13/25  0325     --  142 138   K 3.0* 3.9 3.3* 3.2*     --  104 101   CO2 29  --  24 24   BUN 5*  --  8 9   CREATININE 0.3*  --  0.3* 0.3*     Recent Labs     06/11/25  0520 06/12/25  0310 06/13/25  0325   ALKPHOS 104 102 99   ALT 14 13 12   AST 27 21 17   BILITOT 0.2 0.2 0.3         General appearance: NAD  Head: NCAT, EOMI   Neck: supple, no masses  Lungs: symmetric chest rise, no audible wheezes  Heart: warm throughout  Abdomen: soft, non-distended, non-tender to palpation throughout  Skin: no visible lesions  Extremities: extremities normal, atraumatic, no cyanosis or edema      Assessment/Plan:  Merissa Durbin is a 64 y.o. female with ischemic colitis and ELDA      - Ok for low fiber diet   - Abx per ID  - Monitor abdominal exam  - Leukocytosis resolved WBC 10.1 from 11.6 yesterday       Katy Wen MD  General Surgery Resident, PGY-1    The patient was seen and examined and the chart was reviewed.  I agree with the assessment and plan.  Agree with removal of the fecal management system.  Continue antibiotics as prescribed.  Low residue diet.  Justyn Julio MD  
General Surgery   Daily Progress Note      Patient's Name/Date of Birth: Merissa Durbin / 1960    Date: Kandace 10, 2025       Subjective: Off levo. LA cleared. No other issues overnight. mild abdominal pain. No N/V.    Objective:  BP (!) 100/46   Pulse 96   Temp 99.1 °F (37.3 °C) (Rectal)   Resp 15   Ht 1.702 m (5' 7\")   Wt 60.8 kg (134 lb 0.6 oz)   SpO2 92%   BMI 20.99 kg/m²   Labs:  Recent Labs     06/08/25  1649 06/08/25  2220 06/09/25  0500   WBC 58.3* 57.1* 44.1*   HGB 13.1 10.7* 10.3*   HCT 43.4 37.1 34.5     Recent Labs     06/09/25  0220 06/09/25  0500 06/09/25  2030    140 136   K 5.0 4.9 3.0*   CL 98 95* 86*   CO2 15* 18* 31*   BUN 18 17 10   CREATININE 0.5 0.5 0.4*     Recent Labs     06/08/25  1649 06/08/25  2220 06/09/25  0500   ALKPHOS 133* 116* 124*   ALT 20 15 15   AST 32 27 24   BILITOT 0.4 0.2 0.2         General appearance: NAD  Head: NCAT, PERRL, EOMI, pink conjunctiva  Neck: supple, no masses  Lungs: symmetric chest rise, no audible wheezes  Heart: warm throughout  Abdomen: soft, non-distended, non-tender to palpation throughout  Skin: no visible lesions  Extremities: extremities normal, atraumatic, no cyanosis or edema      Assessment/Plan:  Merissa Durbin is a 64 y.o. female with ischemic colitis and ELDA      - Advance to CLD as long as she stays off pressors   - C/w IVF, IV abx   - Monitor abdominal exam  - Rest per MICU      Donn Pitt DO  General Surgery Resident    The patient was seen and examined and the chart was reviewed.  I agree with the assessment and plan.  Continue antibiotics per infectious disease.  Continue conservative management.  Likely ischemic versus infectious colitis.  Justyn Julio MD    
NP notified of patient critical lab   
Nurse to nurse called to Marian at [place].  
Ok for d/c today per GS.   
Patient admitted from [place] to 211, with the following belongings NONE, placed on monitor, patient oriented to room and unit visiting hours.  Patient guide at bedside, reviewed patient rights and responsibilities. MRSA nasal swab obtained.  Bed alarm on.  Call light within reach.    
Patient reports that there is a baby at the bottom of the bed touching her foot. Patient is crying and yelling very difficulty to reorient. This nurse called the nursing facility to obtain her baseline spoke to Marian. I was informed that the patient can be behavioral and normally is alert x 2. Facility stated that the patient may not want to be discharged and that may be triggering the behavior. Patient is able to answer orientation questions appropriately.   
Perfect serve sent via secure message to Martin  regarding abnormal lab values of K+ 3.2, Cr-0.3, CA 8.1, albumin 3.2.  
SPEECH LANGUAGE PATHOLOGY  DAILY PROGRESS NOTE      PATIENT NAME:  Merissa Durbin      :  1960          TODAY'S DATE:  2025 ROOM:  0211/0211-A    Current Diet: ADULT DIET; Easy to Chew; Low Fiber    Patient seen for ongoing dysphagia tx. Patient reported good toleration with lunch meal, except for difficulty with the rice. Patient stated she has had trouble with the rice since having her tracheostomy ~20 years ago. Discussed with patient that if she would get rice in the future, recommend she does not eat it. Patient tolerated straw sips of thin liquids. No overt s/s of aspiration exhibited.   It should be noted, silent aspiration can not be ruled out at the bedside, and if silent aspiration is suspected based on clinical correlation an MBSS would be recommended.     Recommendation: cont easy to chew diet- recommend no rice      CPT code(s) 25379  dysphagia tx  Total minutes :  10 minutes    Irma Hernandez M.S. CCC-SLP/L  Speech Language Pathologist  [id number]     
SPEECH LANGUAGE PATHOLOGY  DAILY PROGRESS NOTE      PATIENT NAME:  Merissa Durbin      :  1960          TODAY'S DATE:  2025 ROOM:  0211/0211-A    Current Diet: ADULT DIET; Regular; Low Fiber    Patient seen for ongoing dysphagia tx. Patient was adjusted to be sitting upright in bed below 75 degrees, despite adjusted positioning. Patient given trials of thin liquid by straw, puree, and hard solids. Oral phase of swallow appeared within functional limits, as she demonstrated good oral clearance. Mastication was fair due to missing dentition. Therefore, easy to chew diet is recommended. Pharyngeal phase of the swallow noted to be within functional limits. Patient demonstrated latent cough x1. It should be noted, silent aspiration can not be ruled out at the bedside, and if silent aspiration is suspected based on clinical correlation an MBSS would be recommended.     Recommendation: easy to chew diet with thin liquids due to poor mastication; continue implementation of compensatory strategies including sitting upright, slow rate of intake, and small bites/sips      CPT code(s) 61885  dysphagia tx  Total minutes :  10 minutes    Edel Martinez  Speech Pathology Graduate Extern    Irma Hernandez M.S. CCC-SLP/L  Speech Language Pathologist  [id number]         
Spiritual Health History and Assessment/Progress Note  [facility]    Initial Encounter, Spiritual/Emotional Needs,  ,  ,      Name: Merissa Durbin MRN: [id number]    Age: 64 y.o.     Sex: female   Language: English   Bahai: No Adventist on file   Septic shock (HCC)     Date: 6/12/2025                           Spiritual Assessment began in [facility] 2 ICU        Referral/Consult From: Rounding   Encounter Overview/Reason: Initial Encounter, Spiritual/Emotional Needs  Service Provided For: Patient    Velvet, Belief, Meaning:   Patient identifies as spiritual, is connected with a velvet tradition or spiritual practice, and has beliefs or practices that help with coping during difficult times  Family/Friends No family/friends present      Importance and Influence:  Patient has spiritual/personal beliefs that influence decisions regarding their health  Family/Friends No family/friends present    Community:  Patient feels well-supported. Support system includes: Spouse/Partner, Children, and Friends  Family/Friends No family/friends present    Assessment and Plan of Care:     Patient Interventions include: Facilitated expression of thoughts and feelings, Explored spiritual coping/struggle/distress, Affirmed coping skills/support systems, and Provided sacramental/Cheondoism ritual  Family/Friends Interventions include: No family/friends present    Patient Plan of Care: Spiritual Care available upon further referral  Family/Friends Plan of Care: Spiritual Care available upon further referral    Electronically signed by ALEXEI Luz on 6/12/2025 at 9:39 AM   
Spiritual Health History and Assessment/Progress Note  [facility]    Attempted Encounter,  ,  ,      Name: Merissa Durbin MRN: [id number]    Age: 64 y.o.     Sex: female   Language: English   Anabaptism: No Scientology on file   Septic shock (HCC)     Date: 6/9/2025                           Spiritual Assessment began in [address] ICU        Referral/Consult From: Rounding   Encounter Overview/Reason: Attempted Encounter  Service Provided For: Patient    Velvet, Belief, Meaning:   Patient unable to assess at this time  Family/Friends No family/friends present      Importance and Influence:  Patient unable to assess at this time  Family/Friends No family/friends present    Community:  Patient . Unable to assess. Patient was sleeping.   prayed a silent prayer for the patient and left devotional material and information about  services.  Family/Friends No family/friends present    Assessment and Plan of Care:     Patient Interventions include: . Unable to assess. Patient was sleeping.   prayed a silent prayer for the patient and left devotional material and information about  services.  Family/Friends Interventions include: No family/friends present    Patient Plan of Care: Spiritual Care available upon further referral  Family/Friends Plan of Care: No family/friends present    Electronically signed by Chaplain Cecilio on 6/9/2025 at 6:11 PM   
 More alert.  Answers questions.  Slightly contracted.  Skin: Warm and dry. No rashes were noted.   HEENT: Round and reactive pupils.  Moist mucous membranes.  No ulcerations or thrush.  Neck: Supple to movements.   Chest: No use of accessory muscles to breathe. Symmetrical expansion.  No wheezing, crackles or rhonchi.  Cardiovascular: S1 and S2 are rhythmic and regular. No murmurs appreciated.   Abdomen: Positive bowel sounds to auscultation. Benign to palpation. No masses felt.  Suprapubic catheter.  Extremities: No clubbing, no cyanosis, no edema.  Lines: Peripheral.  Left radial arterial line 6/9/2025.  Right IJ TLC 6/8/2025.  Fecal management system.    Laboratory and Tests:  No results found for: \"CRP\"  No results found for: \"SEDRATE\"    Radiology:      Microbiology:   [facility]:  Urine culture 6/8/2025: >100 K Proteus mirabilis (resistant to Cefazolin, Ampicillin and Nitrofurantoin.  Intermediate to Levofloxacin.  Sensitive to all others)  Blood cultures 6/8/2025: Negative so far     [place]:  C. difficile toxin assay: Negative  Nares screening MRSA: Negative  Stool culture: Negative    Recent Labs     06/08/25  1649 06/09/25  0500   PROCAL 6.70* 14.70*       ASSESSMENT:  Acute colitis  Possible complicated UTI secondary to Proteus mirabilis  Leukemoid reaction associated to the above- Improved  Sepsis with septic shock-improved    PLAN:  Continue Ceftriaxone and IV Metronidazole  Check final cultures  We will follow with you    Spoke with nursing.    Kamlesh Mustafa MD  11:40 AM  6/10/2025  
10 8 7   CREATININE 0.4* 0.4* 0.3*   CALCIUM 7.2* 7.5* 7.5*       Assessment:    Principal Problem:    Septic shock (HCC)  Active Problems:    Type 2 diabetes mellitus without complication (HCC)    Multiple sclerosis (HCC)    Colitis    TRIP (acute kidney injury)    AMS (altered mental status)    Lactic acidosis    Hyperkalemia    Urinary tract infection due to Proteus  Resolved Problems:    * No resolved hospital problems. *      Plan:    Septic shock  -likely from colitis but cannot r/o bacteremia  -still on levophed and stress steroids.   Monitor closely in ICU. BP improving. Temp normalizied.     Cont IV flagyl and ceftriaxone. C diff ordered. ID consulted.  Slowly improving. WBC 44 form 57.  Surgery following - repeat CT.  -supportive care.  F/u cultures.     TRIP  -secondary to sepsis with metabolic acidosis and hyperkalemia  Potassium improved to 5 from 5.5.  On bicarb drip. Bicarb improved 18 from 9.  Creat 0.5. baseline 0.2-0.6.     Metabolic encephalopathy  -from sepsis. Hx MS.  -monitor as improves.      DM2  On metformin - held.  Glucose 290-300.  On stress steroids.  Holding TF.  On SSI. May need to add lantus if persist elevated.    6/10/25  Mentation back to baseline.  Off pressors. Stable for PCU.  Hold ACEi.monitor BP.  Treating for colitis and now proteus UTI. Cont ceftriaxone and flagyl day 2.  ID following. Unsure if blood cutlure done.  Stool culture neg. C diff neg.  On stress steroid -will need to taper.  Glucose elevated. 110-180 now. Holding metformin. Cont lantus 10.  Monitor and correct with SSI.  Advance diet.  Renal function baseline now. Potassium low 2.6 - likely low but masked by acidosis.  Replete and monitor. Bicarb 34 - now off bicarb drip. Mag normal.   WBC improved to 21 from 44.          Consults - palliative, critical care, ID, surgery.  Code - limited.  DVT prophylaxis - lovenox  PT OT  Discharge plan - NH        Zaheer Taylor MD  5:16 PM  6/10/2025   
thrush.  Neck: Supple to movements.   Chest: No respiratory distress.  No crackles.  Cardiovascular: Heart sounds rhythmic and regular.  Abdomen: Positive bowel sounds to auscultation. Benign to palpation. No masses felt.  Suprapubic catheter.  Extremities: No clubbing, no cyanosis, no edema.  Lines: Peripheral.  Left radial arterial line 6/9/2025.  Right IJ TLC 6/8/2025.  Fecal management system.    Laboratory and Tests:  Lab Results   Component Value Date    .0 (H) 06/10/2025     Lab Results   Component Value Date    SEDRATE 25 (H) 06/10/2025       Radiology:      Microbiology:   [facility]:  Urine culture 6/8/2025: >100 K Proteus mirabilis (resistant to Cefazolin, Ampicillin and Nitrofurantoin.  Intermediate to Levofloxacin.  Sensitive to all others)  Blood cultures 6/8/2025: Negative so far     [place]:  C. difficile toxin assay: Negative  Nares screening MRSA: Negative  Stool culture: Negative    ASSESSMENT:  Acute colitis-improving  Possible complicated UTI secondary to Proteus mirabilis  Leukemoid reaction associated to the above- Improved  Sepsis with septic shock-improved    PLAN:  Continue Cefdinir.  Metronidazole can be stopped tomorrow  We will follow with you    Spoke with nursing.    Kamlesh Mustafa MD  8:48 AM  6/12/2025  
Liquid    PROCEDURE:  Consistencies Administered During the Evaluation   Liquids: thin liquid   Solids:  Not appropriate - jello      Method of Intake:   cup, straw, spoon  Self fed, Hand over hand assist      Position:   Sitting in bed with head elevated above 75 degrees    CLINICAL ASSESSMENT:  Oral Stage:       Decreased mastication due to:  cognitive function      Pharyngeal Stage:    Latent dry cough was noted after presentation of thin liquid x1 only. However, various other trials were administered. Unable to rule out aspiration at the bedside     Cognition:   Confusion noted    Oral Peripheral Examination   Generalized oral weakness    Current Respiratory Status    Room air     Parameters of Speech Production  Respiration:  Adequate for speech production  Quality:   Within functional limits  Intensity: Within functional limits    Volitional Swallow: Present     Volitional Cough:  Present     Pain: No pain reported.    EDUCATION:   The Speech Language Pathologist (SLP) completed education regarding results of evaluation and that intervention is warranted at this time.  Learner: Patient  Education: Reviewed results and recommendations of this evaluation  Evaluation of Education:  Needs further instruction    This plan may be re-evaluated and revised as warranted.      Evaluation Time includes thorough review of current medical information, gathering information on past medical history/social history and prior level of function, completion of standardized testing/informal observation of tasks, assessment of data and education on plan of care and goals.    [x]The admitting diagnosis and active problem list, have been reviewed prior to initiation of this evaluation.        ACTIVE PROBLEM LIST:   Patient Active Problem List   Diagnosis    Dental caries    Right lower quadrant abdominal pain    Type 2 diabetes mellitus without complication (HCC)    Multiple sclerosis (HCC)    Anxiety    Essential hypertension    
acidosis  Neurogenic bladder  NNIDDM  Multiple sclerosis  Chronic pain      Plan:  Neuro   #Acute metabolic Encephalopathy  #MS  #Chronic Pain  -GCS=9 at [facility], improved with treatment, remains drowsy and confused  -Continue to monitor with treatment  -Total care at facility  -Fentanyl PRN (previously on Fentanyl patch but uncertain if still on, will verify with facility)  -Continue home Baclofen, Citalopram, Remeron     Respiratory   -2 L NC  -Encourage IS and mobilization     Cardiovascular   #Septic Shock  #Hx of HTN  -given 2L LR at [facility]  -2L LR now, bicarb gtt @125ml/hr  -Norepinephrine  and Vasopressin gtt for MAP >65  -Hold home Lisinopril and Furosemide  -No previous ECHO on file at this facility, will order one here  -troponin slightly elevated, will continue to trend  -Hydrocortisone 100mg q8h     Gastrointestinal   #Infectious Colitis  -Zofran/Compazine PRN for nausea/vomiting  -abx, ceftriaxone / flagyl  -Surgery consulted, appreciate recommendations for follow repeat CT abdomen pelvis     Renal/   #NAGMA  #Lactic Acidosis - improving to 3.0 (from 16.6)  #Neurogenic Bladder s/p suprapubic catheter  #Hyperkalemia  -BUN/Cr WNL, continue to monitor with serial labs  -Avoid nephrotoxins  -Strict I&O  -Hold home Furosemide  -acidosis due to sepsis/diarrhea/vomiting and dehydration, continue to monitor with resuscitation  -Catheter changed on admission to [place]  -D10W & insulin, Calcium, Bicarb, hold on oral meds at this time     Infectious Disease   #Septic Shock from Infectious Colitis  -Noted on CT A/P from [facility]  -Procalcitonin 6.7, repeat this a.m 14.7  -Ceftriaxone and Flagyl   -Stool culture and Cdiff pending  -UA negative for infection, Muhammad changed and will repeat UA  -Blood cultures from [facility] pending from 6/8, plan to call in am for results     Hematology/Oncology   #Hx of DVT/PE  -Leukemoid reaction most likely secondary to colitis  - Hold apixaban for possible 
steroids still, tapering.   Potassium 3. - repleting. Normal mag.  Bicarb 29 off drip.  Hgb trending down 9.8 to 9.  Checking iron studies.         Consults - palliative, critical care, ID, surgery.  Code - limited.  DVT prophylaxis - eliquis. Hx DVT/PE  PT OT  Discharge plan - NH            Zaheer Taylor MD  9:28 AM  6/11/2025

## 2025-06-13 NOTE — DISCHARGE INSTRUCTIONS
Sepsis: Care Instructions  Overview     Sepsis is a serious reaction to an infection. It causes inflammation across large areas of the body and can damage tissue and organs. It can lead to extremely low blood pressure. Infections that can lead to sepsis include:  A skin infection such as from a cut.  A lung infection like pneumonia.  A urinary tract infection.  A gut infection such as E. coli.  Sepsis is treated with antibiotics. Your doctor will try to find the infection that led to sepsis. You'll also get fluids through a vein (I.V.). Machines will track your vital signs, including temperature, blood pressure, breathing rate, and pulse rate.  The physical and mental effects of sepsis may not be seen for several weeks after treatment. And they may last long after the infection is gone.  Physical problems may include:  Feeling weak and tired.  Feeling out of breath.  Aches and pains.  Problems with getting around.  Trouble falling asleep or staying asleep.  Dry and itchy skin, brittle nails, and hair loss.  Some of these effects can lead to problems with your organs or your feet, legs, hands, or arms.  Sepsis can also affect your mind and emotions. Problems may include:  Self-doubt.  Anxiety.  Nightmares.  Depression and mood problems.  Wanting to avoid other people.  Confusion.  Flashbacks and bad memories of your illness.  It's important to care for yourself and try to avoid infections. This may lower your risk of getting sepsis again.  Follow-up care is a key part of your treatment and safety. Be sure to make and go to all appointments, and call your doctor if you are having problems. It's also a good idea to know your test results and keep a list of the medicines you take.  How can you care for yourself at home?  Be safe with medicines. Take your medicines exactly as prescribed. Call your doctor if you think you are having a problem with your medicine.  If your doctor prescribed antibiotics, take them as

## 2025-06-13 NOTE — PLAN OF CARE
Problem: Chronic Conditions and Co-morbidities  Goal: Patient's chronic conditions and co-morbidity symptoms are monitored and maintained or improved  6/10/2025 0137 by Judith Luna RN  Outcome: Progressing  Flowsheets (Taken 6/9/2025 2000)  Care Plan - Patient's Chronic Conditions and Co-Morbidity Symptoms are Monitored and Maintained or Improved: Monitor and assess patient's chronic conditions and comorbid symptoms for stability, deterioration, or improvement  6/9/2025 1820 by Alice Hadley RN  Outcome: Progressing     Problem: Discharge Planning  Goal: Discharge to home or other facility with appropriate resources  6/10/2025 0137 by Judith Luna RN  Outcome: Progressing  Flowsheets (Taken 6/9/2025 2000)  Discharge to home or other facility with appropriate resources: Identify barriers to discharge with patient and caregiver  6/9/2025 1820 by Alice Hadley RN  Outcome: Progressing     Problem: Confusion  Goal: Confusion, delirium, dementia, or psychosis is improved or at baseline  Description: INTERVENTIONS:1. Assess for possible contributors to thought disturbance, including medications, impaired vision or hearing, underlying metabolic abnormalities, dehydration, psychiatric diagnoses, and notify attending LIP2. [place] high risk fall precautions, as indicated3. Provide frequent short contacts to provide reality reorientation, refocusing and direction4. Decrease environmental stimuli, including noise as appropriate5. Monitor and intervene to maintain adequate nutrition, hydration, elimination, sleep and activity6. If unable to ensure safety without constant attention obtain sitter and review sitter guidelines with assigned personnel7. Initiate Psychosocial CNS and Spiritual Care consult, as indicated  INTERVENTIONS:1. Assess for possible contributors to thought disturbance, including medications, impaired vision or hearing, underlying metabolic abnormalities, 
  Problem: Chronic Conditions and Co-morbidities  Goal: Patient's chronic conditions and co-morbidity symptoms are monitored and maintained or improved  6/9/2025 1820 by Alice Hadley RN  Outcome: Progressing  6/9/2025 0421 by Ana Torrez RN  Outcome: Progressing  Flowsheets (Taken 6/8/2025 2200)  Care Plan - Patient's Chronic Conditions and Co-Morbidity Symptoms are Monitored and Maintained or Improved: Monitor and assess patient's chronic conditions and comorbid symptoms for stability, deterioration, or improvement     Problem: Discharge Planning  Goal: Discharge to home or other facility with appropriate resources  6/9/2025 1820 by Alice Hadley RN  Outcome: Progressing  6/9/2025 0421 by Ana Torrez RN  Outcome: Progressing  Flowsheets (Taken 6/8/2025 2200)  Discharge to home or other facility with appropriate resources: Identify barriers to discharge with patient and caregiver     Problem: Confusion  Goal: Confusion, delirium, dementia, or psychosis is improved or at baseline  Description: INTERVENTIONS:1. Assess for possible contributors to thought disturbance, including medications, impaired vision or hearing, underlying metabolic abnormalities, dehydration, psychiatric diagnoses, and notify attending LIP2. [place] high risk fall precautions, as indicated3. Provide frequent short contacts to provide reality reorientation, refocusing and direction4. Decrease environmental stimuli, including noise as appropriate5. Monitor and intervene to maintain adequate nutrition, hydration, elimination, sleep and activity6. If unable to ensure safety without constant attention obtain sitter and review sitter guidelines with assigned personnel7. Initiate Psychosocial CNS and Spiritual Care consult, as indicated  INTERVENTIONS:1. Assess for possible contributors to thought disturbance, including medications, impaired vision or hearing, underlying metabolic abnormalities, dehydration, psychiatric 
  Problem: Chronic Conditions and Co-morbidities  Goal: Patient's chronic conditions and co-morbidity symptoms are monitored and maintained or improved  Outcome: Progressing  Flowsheets (Taken 6/8/2025 2200)  Care Plan - Patient's Chronic Conditions and Co-Morbidity Symptoms are Monitored and Maintained or Improved: Monitor and assess patient's chronic conditions and comorbid symptoms for stability, deterioration, or improvement     Problem: Discharge Planning  Goal: Discharge to home or other facility with appropriate resources  Outcome: Progressing  Flowsheets (Taken 6/8/2025 2200)  Discharge to home or other facility with appropriate resources: Identify barriers to discharge with patient and caregiver     Problem: Confusion  Goal: Confusion, delirium, dementia, or psychosis is improved or at baseline  Description: INTERVENTIONS:1. Assess for possible contributors to thought disturbance, including medications, impaired vision or hearing, underlying metabolic abnormalities, dehydration, psychiatric diagnoses, and notify attending LIP2. [place] high risk fall precautions, as indicated3. Provide frequent short contacts to provide reality reorientation, refocusing and direction4. Decrease environmental stimuli, including noise as appropriate5. Monitor and intervene to maintain adequate nutrition, hydration, elimination, sleep and activity6. If unable to ensure safety without constant attention obtain sitter and review sitter guidelines with assigned personnel7. Initiate Psychosocial CNS and Spiritual Care consult, as indicated  INTERVENTIONS:1. Assess for possible contributors to thought disturbance, including medications, impaired vision or hearing, underlying metabolic abnormalities, dehydration, psychiatric diagnoses, and notify attending LIP2. [place] high risk fall precautions, as indicated3. Provide frequent short contacts to provide reality reorientation, refocusing and direction4. Decrease environmental 
Brief Transfer Note:    65 yo female patient from [facility] to be transferred to [facility] ICU.    This patient is a resident from a local nursing home in [facility] that was transported to [facility] ED  with concerns for depressed mentation, hypothermia, and general failure to thrive. Patient is a documented DNR-CCA, and has a pertinent history of IDDM and DVT for which she is reportedly on 2.5mg BID of Eliquis.    Upon workup in ED, patient was found to be hypotensive, with an initial lactate of 16.6, WBC of 58., creatinine of 0.72 (baseline ~0.30), and Hgb of 13.1 (baseline 9). CTA-A/P imaging demonstrating colitis without abscess, UA was negative, procal 6.7. The balance of the lab work was non-contributory. Working diagnosis of septic shock, developing TRIP, and a likely component of dehydration at this time.      Patient received the 30ml/kg fluid bolus per sepsis guidelines, and was started on Cefepime/Flagyl (uncertain at this time if OSH obtained blood cultures prior to ATB therapy). As patient was not satisfactorily responsive to fluids, a right subclavian CVC line was placed and levophed was initiated. Vitals at this time are 106/45, , RR 19, SpO2 95%, Temp 96.4 degrees F.     ED attending at [facility] (Dr. Sharpe) reached out to transfer center to initiate ED to ICU transfer. Dr. Miles will be accepting intensivist at [place], and [facility] (Dr. Keating) will admit the patient to their service.     Assessment and Plan  ##Septic Shock  ##Acute kidney injury  -ATB therapy, narrow therapy if cultures available  -Ongoing fluid bolus to maintain euvolemia  -Vasopressor use to keep MAP >65mmHg  -High risk respiratory protocol  -Maintain SpO2 >92%  -Ongoing cardiac monitoring  -Daily labs while in ICU  -UO > 0.5-1.0 ml/kg/min  -Avoid nephrotoxins  -Replete electrolytes as appropriate    -->Mag >2, Phos >3.0, Potassium >4.0  -Bedside swallow prior to diet  -Consider arterial line for closer 
sitter and review sitter guidelines with assigned personnel7. Initiate Psychosocial CNS and Spiritual Care consult, as indicated  INTERVENTIONS:1. Assess for possible contributors to thought disturbance, including medications, impaired vision or hearing, underlying metabolic abnormalities, dehydration, psychiatric diagnoses, and notify attending LIP2. [place] high risk fall precautions, as indicated3. Provide frequent short contacts to provide reality reorientation, refocusing and direction4. Decrease environmental stimuli, including noise as appropriate5. Monitor and intervene to maintain adequate nutrition, hydration, elimination, sleep and activity6. If unable to ensure safety without constant attention obtain sitter and review sitter guidelines with assigned personnel7. Initiate Psychosocial CNS and Spiritual Care consult, as indicated  INTERVENTIONS:1. Assess for possible contributors to thought disturbance, including medications, impaired vision or hearing, underlying metabolic abnormalities, dehydration, psychiatric diagnoses, and notify attending LIP2. [place] high risk fall precautions, as indicated3. Provide frequent short contacts to provide reality reorientation, refocusing and direction4. Decrease environmental stimuli, including noise as appropriate5. Monitor and intervene to maintain adequate nutrition, hydration, elimination, sleep and activity6. If unable to ensure safety without constant attention obtain sitter and review sitter guidelines with assigned personnel7. Initiate Psychosocial CNS and Spiritual Care consult, as indicated  6/13/2025 0230 by Ck Molina RN  Outcome: Progressing  6/13/2025 0229 by Ck Molina RN  Outcome: Progressing     Problem: Safety - Adult  Goal: Free from fall injury  6/13/2025 0230 by Ck Molina RN  Outcome: Progressing  6/13/2025 0229 by Ck Molina RN  Outcome: Progressing     Problem: ABCDS Injury Assessment  Goal: Absence of 
1244 by Ramos, Lexx, RN  Outcome: Progressing  6/13/2025 0230 by Ck Molina RN  Outcome: Progressing  6/13/2025 0229 by Ck Molina RN  Outcome: Progressing     Problem: Skin/Tissue Integrity  Goal: Skin integrity remains intact  Description: 1.  Monitor for areas of redness and/or skin breakdown2.  Assess vascular access sites hourly3.  Every 4-6 hours minimum:  Change oxygen saturation probe site4.  Every 4-6 hours:  If on nasal continuous positive airway pressure, respiratory therapy assess nares and determine need for appliance change or resting period  6/13/2025 1244 by Lexx Ramos RN  Outcome: Progressing  6/13/2025 0230 by Ck Molina RN  Outcome: Progressing  6/13/2025 0229 by Ck Molina RN  Outcome: Progressing     Problem: ABCDS Injury Assessment  Goal: Absence of physical injury  6/13/2025 1244 by Lexx Ramos RN  Outcome: Progressing  6/13/2025 0230 by Ck Molina RN  Outcome: Progressing  6/13/2025 0229 by Ck Molina RN  Outcome: Progressing     Problem: Pain  Goal: Verbalizes/displays adequate comfort level or baseline comfort level  6/13/2025 1244 by Lexx Ramos RN  Outcome: Progressing  6/13/2025 0230 by Ck Molina RN  Outcome: Progressing  6/13/2025 0229 by Ck Molina RN  Outcome: Progressing     
Initiate Psychosocial CNS and Spiritual Care consult, as indicated  6/11/2025 1702 by Jackeline Crain RN  Outcome: Progressing  6/11/2025 0654 by Judith Luna RN  Outcome: Progressing     Problem: Safety - Adult  Goal: Free from fall injury  6/11/2025 1702 by Jackeline Crain RN  Outcome: Progressing  6/11/2025 0654 by Judith Luna RN  Outcome: Progressing     Problem: Skin/Tissue Integrity  Goal: Skin integrity remains intact  Description: 1.  Monitor for areas of redness and/or skin breakdown2.  Assess vascular access sites hourly3.  Every 4-6 hours minimum:  Change oxygen saturation probe site4.  Every 4-6 hours:  If on nasal continuous positive airway pressure, respiratory therapy assess nares and determine need for appliance change or resting period  6/11/2025 1702 by Jackeline Crain RN  Outcome: Progressing  Flowsheets (Taken 6/11/2025 0750)  Skin Integrity Remains Intact: Monitor for areas of redness and/or skin breakdown  6/11/2025 0654 by Judith Luna RN  Outcome: Progressing     Problem: ABCDS Injury Assessment  Goal: Absence of physical injury  6/11/2025 1702 by Jackeline Crain RN  Outcome: Progressing  6/11/2025 0654 by Judith Luna RN  Outcome: Progressing     Problem: Pain  Goal: Verbalizes/displays adequate comfort level or baseline comfort level  6/11/2025 1702 by Jackeline Crain RN  Outcome: Progressing  6/11/2025 0654 by Judith Luna RN  Outcome: Progressing  Flowsheets (Taken 6/10/2025 2000)  Verbalizes/displays adequate comfort level or baseline comfort level: Assess pain using appropriate pain scale     
physical injury  Outcome: Progressing     Problem: Pain  Goal: Verbalizes/displays adequate comfort level or baseline comfort level  Outcome: Progressing     
RN  Outcome: Progressing  Flowsheets (Taken 6/11/2025 2000)  Skin Integrity Remains Intact:   Monitor for areas of redness and/or skin breakdown   Assess vascular access sites hourly   Every 4-6 hours minimum:  Change oxygen saturation probe site     Problem: ABCDS Injury Assessment  Goal: Absence of physical injury  6/12/2025 1114 by Jackeline Crain, RN  Outcome: Progressing  Flowsheets (Taken 6/12/2025 1100)  Absence of Physical Injury: Implement safety measures based on patient assessment  6/12/2025 0532 by Janie Card, RN  Outcome: Progressing     Problem: Pain  Goal: Verbalizes/displays adequate comfort level or baseline comfort level  6/12/2025 1114 by Jackeline Crain, RN  Outcome: Progressing  6/12/2025 0532 by Janie Card, RN  Outcome: Progressing     
determine need for appliance change or resting period   Turn and reposition as indicated   Assess need for specialty bed   Positioning devices   Pressure redistribution bed/mattress (bed type)   Check visual cues for pain   Monitor skin under medical devices     Problem: ABCDS Injury Assessment  Goal: Absence of physical injury  Outcome: Progressing  Flowsheets (Taken 6/10/2025 1000)  Absence of Physical Injury: Implement safety measures based on patient assessment     Problem: Pain  Goal: Verbalizes/displays adequate comfort level or baseline comfort level  Outcome: Progressing  Flowsheets (Taken 6/10/2025 0800)  Verbalizes/displays adequate comfort level or baseline comfort level:   Assess pain using appropriate pain scale   Administer analgesics based on type and severity of pain and evaluate response   Implement non-pharmacological measures as appropriate and evaluate response   Encourage patient to monitor pain and request assistance   Consider cultural and social influences on pain and pain management   Notify Licensed Independent Practitioner if interventions unsuccessful or patient reports new pain     
for areas of redness and/or skin breakdown   Assess vascular access sites hourly   Every 4-6 hours minimum:  Change oxygen saturation probe site  6/11/2025 1702 by Jackeline Crain, RN  Outcome: Progressing  Flowsheets  Taken 6/11/2025 1600  Skin Integrity Remains Intact: Monitor for areas of redness and/or skin breakdown  Taken 6/11/2025 0750  Skin Integrity Remains Intact: Monitor for areas of redness and/or skin breakdown     Problem: ABCDS Injury Assessment  Goal: Absence of physical injury  6/12/2025 0532 by Janie Card RN  Outcome: Progressing  6/11/2025 1702 by Jackeline Crain, RN  Outcome: Progressing  Flowsheets (Taken 6/11/2025 1600)  Absence of Physical Injury: Implement safety measures based on patient assessment     Problem: Pain  Goal: Verbalizes/displays adequate comfort level or baseline comfort level  6/12/2025 0532 by Janie Card RN  Outcome: Progressing  6/11/2025 1702 by Jackeline Crain, RN  Outcome: Progressing     
Notify Licensed Independent Practitioner if interventions unsuccessful or patient reports new pain

## 2025-06-17 NOTE — PROGRESS NOTES
3/13/2021     Jose Altamirano    : 1960 Sex: female   Age: 56 y.o. Chief Complaint   Patient presents with    Anxiety    Diabetes    Other       HPI: A follow-up evaluation and management of chronic medical problems for this 56y.o. year-old female resident. The patient presents today with a complaint of right hand pain. The patient states it takes her over an hour to resolve the morning stiffness. Current medication list reviewed. The patient is tolerating all medications well without adverse events or known side effects. ROS:   Const: Denies changes in appetite, chills, fever, night sweats and weight loss. Eyes:  Denies discharge, a recent change in visual acuity, blurred vision and double vision. ENMT: Denies discharge of the ears, hearing loss, pain of the ears. Denies mouth or throat symptoms. CV:  Denies chest pain, dyspnea on exertion, orthopnea, palpitations and PND  Resp: Denies chest pain, cough, SOB and wheezing. GI: Denies abdominal pain, constipation, diarrhea, heartburn, indigestion, nausea and vomiting. : Denies dysuria, frequency, hematuria, nocturia and urgency. Musculo: Pain right hand  Skin:  Denies lesions, pruritus and rash. Neuro: Denies dizziness, lightheadedness, numbness, tingling and weakness. Psych:  Denies anxiety and depression  Endocrine: Denies polyuria, polydipsia, polyphagia, weight gain, dry skin, constipation, cold intolerance, heat intolerance, palpitations or tremors. Hema/Lymph: Denies hematologic symptoms  Allergy/Immuno:  Denies allergic/immunologic symptoms.   Pertinent positives reviewed and noted      Current Outpatient Medications:     warfarin (COUMADIN) 2.5 MG tablet, Take 2.5 mg by mouth daily, Disp: , Rfl:     oxybutynin (DITROPAN-XL) 5 MG extended release tablet, Take 5 mg by mouth daily, Disp: , Rfl:     furosemide (LASIX) 20 MG tablet, Take 20 mg by mouth daily, Disp: , Rfl:     citalopram (CELEXA) 40 MG tablet, Take 40 mg by The following individual(s) verbally consented to be recorded using ambient AI listening technology and understand that they can each withdraw their consent to this listening technology at any point by asking the clinician to turn off or pause the recording:    Patient name: Ruchi Obrien   Additional names:         mouth daily Instructed to take morning of surgery with a sip of water, Disp: , Rfl:     lisinopril (PRINIVIL;ZESTRIL) 5 MG tablet, Take 5 mg by mouth daily, Disp: , Rfl:     ferrous sulfate 325 (65 Fe) MG tablet, Take 325 mg by mouth daily (with breakfast), Disp: , Rfl:     potassium chloride (MICRO-K) 10 MEQ extended release capsule, Take 10 mEq by mouth 2 times daily, Disp: , Rfl:     metFORMIN (GLUCOPHAGE) 850 MG tablet, Take 850 mg by mouth 3 times daily, Disp: , Rfl:     atorvastatin (LIPITOR) 40 MG tablet, Take 40 mg by mouth daily, Disp: , Rfl:     amitriptyline (ELAVIL) 50 MG tablet, Take 50 mg by mouth nightly, Disp: , Rfl:     glipiZIDE (GLUCOTROL) 10 MG tablet, Take 10 mg by mouth 2 times daily (before meals), Disp: , Rfl:     baclofen (LIORESAL) 10 MG tablet, Take 10 mg by mouth 3 times daily , Disp: , Rfl:     ranitidine (ZANTAC 150 MAXIMUM STRENGTH) 150 MG tablet, Take 150 mg by mouth 2 times daily Instructed to take morning of surgery with a sip of water, Disp: , Rfl:     Natalizumab (TYSABRI IV), Infuse intravenously every 30 days, Disp: , Rfl:     Allergies   Allergen Reactions    Latex Other (See Comments)     Hands peeled    Adhesive Tape Other (See Comments)     Skin peels    Iodine Swelling    Penicillins Other (See Comments)     Unsure of reaction    Peroxide [Hydrogen Peroxide] Other (See Comments)     Wound gets pus in it    Petroleum Jelly [Petrolatum] Other (See Comments)     Skin irritation, \"eats away skin\"    Sulfa Antibiotics Hives    Clindamycin/Lincomycin Rash     Skin peels    Demerol Hcl [Meperidine] Nausea And Vomiting       Past Medical History:   Diagnosis Date    Cerebral artery occlusion with cerebral infarction ([facility] [address] 2004    no residual    Depression     Diabetes mellitus (HCC)     GERD (gastroesophageal reflux disease)     Hyperlipidemia     Hypertension     [facility]     non ambulatory, uses w/c, arleth, total care     Social History Socioeconomic History    Marital status:      Spouse name: Not on file    Number of children: Not on file    Years of education: Not on file    Highest education level: Not on file   Occupational History    Not on file   Social Needs    Financial resource strain: Not on file    Food insecurity     Worry: Not on file     Inability: Not on file    Transportation needs     Medical: Not on file     Non-medical: Not on file   Tobacco Use    Smoking status: Former Smoker    Tobacco comment: age 23   Substance and Sexual Activity    Alcohol use: No    Drug use: No    Sexual activity: Not on file   Lifestyle    Physical activity     Days per week: Not on file     Minutes per session: Not on file    Stress: Not on file   Relationships    Social connections     Talks on phone: Not on file     Gets together: Not on file     Attends Sikh service: Not on file     Active member of club or organization: Not on file     Attends meetings of clubs or organizations: Not on file     Relationship status: Not on file    Intimate partner violence     Fear of current or ex partner: Not on file     Emotionally abused: Not on file     Physically abused: Not on file     Forced sexual activity: Not on file   Other Topics Concern    Not on file   Social History Narrative    Not on file     Past Surgical History:   Procedure Laterality Date    APPENDECTOMY      CHOLECYSTECTOMY      GASTROSTOMY TUBE PLACEMENT  2004. placed after CVA    removed current    HYSTERECTOMY      OTHER SURGICAL HISTORY  06/30/2017    dental extractions    TRACHEOTOMY  2004    at the time of the CVA in 2004      No family history on file. Exam: Const: Appears healthy and well developed. No signs of acute distress present. Eyes: PERRL  ENMT: Tympanic membranes are intact. Nasal mucosa intact without noted erythema Septum is in the midline. Posterior pharynx shows no exudate, irritation or redness.   Neck:  Supple without adenopathy. Adequate range of motion   Resp: No rales, rhonchi, wheezes appreciated over the lungs bilaterally. CV: S1, S2 within normal limits. Regular rate and rhythm noted. Without murmur, gallop or rub. Extremities:  Pulses intact. Without noted edema. Abdomen: Positive bowel sounds. Palpation reveals softness, with no distension, organomegaly or tenderness. No abdominal masses palpable. Skin: Skin is warm and dry. Musculo: Unchanged upon examination. Neuro: Alert and oriented x3. Psych: Mood is normal.  Affect is normal.   Vital signs reviewed. Visit Diagnoses       Codes    Pain in joints of right hand     M25.541           No flowsheet data found. Plan Per Assessment:  Aneudy Monahan was seen today for anxiety, diabetes and other. Diagnoses and all orders for this visit:    Type 2 diabetes mellitus without complication, without long-term current use of insulin (HCC)    Multiple sclerosis (HCC)    Anxiety    Pain in joints of right hand      Rheumatoid panel. Nursing staff advised to call with any further noted change in clinical status. Return in about 4 weeks (around 4/10/2021) for MEDICATION CHECK, FOLLOW UP [address] [facility] Lee Lovell MD    Note was generated with the assistance of voice recognition software. Document was reviewed however may contain grammatical errors.

## 2025-06-19 LAB
SEND OUT REPORT: NORMAL
TEST NAME: NORMAL